# Patient Record
Sex: FEMALE | Race: WHITE | NOT HISPANIC OR LATINO | Employment: FULL TIME | URBAN - METROPOLITAN AREA
[De-identification: names, ages, dates, MRNs, and addresses within clinical notes are randomized per-mention and may not be internally consistent; named-entity substitution may affect disease eponyms.]

---

## 2021-01-27 ENCOUNTER — VIRTUAL VISIT (OUTPATIENT)
Dept: ENDOCRINOLOGY | Facility: CLINIC | Age: 21
End: 2021-01-27
Payer: COMMERCIAL

## 2021-01-27 ENCOUNTER — MEDICAL CORRESPONDENCE (OUTPATIENT)
Dept: HEALTH INFORMATION MANAGEMENT | Facility: CLINIC | Age: 21
End: 2021-01-27

## 2021-01-27 DIAGNOSIS — E10.65 TYPE 1 DIABETES MELLITUS WITH HYPERGLYCEMIA (H): Primary | ICD-10-CM

## 2021-01-27 DIAGNOSIS — Z46.81 INSULIN PUMP TITRATION: ICD-10-CM

## 2021-01-27 PROCEDURE — 99204 OFFICE O/P NEW MOD 45 MIN: CPT | Mod: 95 | Performed by: INTERNAL MEDICINE

## 2021-01-27 RX ORDER — SYRINGE-NEEDLE,INSULIN,0.5 ML 27GX1/2"
SYRINGE, EMPTY DISPOSABLE MISCELLANEOUS
COMMUNITY
Start: 2020-05-19 | End: 2023-02-24

## 2021-01-27 RX ORDER — CHLORPHENIR/PHENYLEPH/ASPIRIN 2-7.8-325
1 TABLET, EFFERVESCENT ORAL
Status: ON HOLD | COMMUNITY
Start: 2020-05-19 | End: 2022-09-28

## 2021-01-27 SDOH — HEALTH STABILITY: MENTAL HEALTH: HOW OFTEN DO YOU HAVE 6 OR MORE DRINKS ON ONE OCCASION?: NEVER

## 2021-01-27 SDOH — HEALTH STABILITY: MENTAL HEALTH: HOW MANY STANDARD DRINKS CONTAINING ALCOHOL DO YOU HAVE ON A TYPICAL DAY?: NOT ASKED

## 2021-01-27 SDOH — HEALTH STABILITY: MENTAL HEALTH: HOW OFTEN DO YOU HAVE A DRINK CONTAINING ALCOHOL?: NEVER

## 2021-01-27 NOTE — PROGRESS NOTES
"Kimberly is a 21 year old who is being evaluated via a billable telephone visit.      What phone number would you like to be contacted at? [phone]  How would you like to obtain your AVS? Mail a copy    CC: Type 1 DM    HPI:   Patient presents for management of type 1 DM.   Original diagnosis made in 2009.     She is using an insulin pump.   She tried using subcutaneous injections for a few months and noticed her control deteriorated.   Her pump broke in 11/2020.   She is currently on a TelemetryWeb 670 pump and looking to get a 770 G pump.   She does not have insurance so will likely have to go back to MDI for a while.     Basal- MN 0.70, 1800 0.800  ICR - 10  ISF - 50 (starts at 200)  AIT - 3 hours.     Tries to check glucose 4/day.   She had a DEXCOM in the past but then insurance no longer covered.     She had a Rx for the [place] but it was not covered by insurance.     [phone]  [phone]   [phone]  [phone]  [phone]  [phone]  [phone]  [phone]    Snack at 2000. She will give insulin to cover.     Admits she has worked with therapist in the past.   Mother used to nag her about checking her glucose. She moved out when patient was 14.   Then her control deteriorated.     Feels she is very adept at carb counting.     ROS: 10 point ROS neg other than the symptoms noted above in the HPI.    PMH:   Type 1 DM  Anxiety  Insulin pump use  Ventricular septal defect    Meds:  Current Outpatient Medications   Medication     Acetone, Urine, Test (KETONE TEST) STRP     blood glucose (CONTOUR NEXT TEST) test strip     blood glucose monitoring (ACCU-CHEK MULTICLIX) lancets     etonogestrel (NEXPLANON) 68 MG IMPL     glucagon 1 MG kit     insulin aspart (NOVOLOG VIAL) 100 UNITS/ML vial     insulin lispro (HUMALOG PENFILL) 100 UNIT/ML Cartridge     insulin pen needle (32G X 4 MM) 32G X 4 MM miscellaneous     insulin syringe-needle U-100 (31G X 5/16\" 0.5 ML) 31G X 5/16\" 0.5 ML miscellaneous     No " current facility-administered medications for this visit.      FHX:   No DM.     SHX:  Moved to [facility] in 8/2020.   Works as a nanny.   Non-smoker.     Exam:   Gen: In NAD.     A/P:   Type 1 DM - Outside records reviewed. She is going to need to switch back to MDI while she waits on insurance coverage. Pattern of AM highs. Given she is covering her snacks, I suspect she needs a higher basal rate overnight.   -Lab in 4 weeks.   -Until you have to turn the pump in, try running a basal rate of 0.85 U from 0400 to 0800.   -Two hours prior to shutting off the pump, give 10 Units of basaglar at night. The next morning give 7 Units of basaglar and then continue at 7 U AM and 10 U HS.    -No change to humalog dosing for meals once change to subcutaneous injections from the pump.   -Once she is back on the pump, we can try lowering her target glucose range slowly.   -ASA not indicated.  -BP: due to be checked.   -Lipids: Trg 78, HDL 58,  in 6/2020. Statin not indicated.   -Microalbumin normal in 6/2020. ACEi not indicated.   -TSH normal in 6/2020.   -Eyes: In 5/2019, no DR noted. Due for exam  -Smoking: none.      Delmar Dupont M.D    Due to the COVID 19 pandemic this visit was a telephone/video visit in order to help prevent spread of infection in this high risk patient and the general population. The patient gave verbal consent for the visit today.    I have independently reviewed and interpreted labs, imaging as indicated.     Chart review/prep time 1  0350  Chart review/prep time 2 1600  Visit Start time 1600  Visit Stop time 1628  38 minutes spent on the date of the encounter doing chart review, history and exam, documentation and further activities as noted above.  If this were an in person visit, it would be billed as 63054.  Delmar Dupont MD on 1/27/2021 at 4:29 PM

## 2021-04-14 ENCOUNTER — TELEPHONE (OUTPATIENT)
Dept: ENDOCRINOLOGY | Facility: CLINIC | Age: 21
End: 2021-04-14

## 2021-04-14 NOTE — TELEPHONE ENCOUNTER
Called Kecia and left , informed that paperwork was faxed back. Informed that I can refax on Friday when back onsite, otherwise she can fax paperwork to [place] for us to complete again tomorrow. Fax number given. Clinic number given to call with any questions.     Radha KAPADIA MA

## 2021-04-14 NOTE — TELEPHONE ENCOUNTER
Reason for call:  Other   Patient called regarding (reason for call): call back  Additional comments: paper work  Was faxed to the clinic on April 6 for the loaner insulin pump. Please call regarding the paper work.     Was faxed to [phone]    Phone number to reach patient:  Home number on file [phone] (home)    Best Time:   Any     Can we leave a detailed message on this number?  YES    Travel screening: Not Applicable

## 2021-05-10 ENCOUNTER — TELEPHONE (OUTPATIENT)
Dept: ENDOCRINOLOGY | Facility: CLINIC | Age: 21
End: 2021-05-10

## 2021-05-10 DIAGNOSIS — E10.65 TYPE 1 DIABETES MELLITUS WITH HYPERGLYCEMIA (H): Primary | ICD-10-CM

## 2021-05-10 NOTE — TELEPHONE ENCOUNTER
Forms received for patient. But...   I need some labs before I can fill them out and she is also due for a clinic visit.     Delmar Dupont MD on 5/10/2021 at 2:13 PM

## 2021-05-10 NOTE — TELEPHONE ENCOUNTER
Called and spoke with patient and informed her that she is due for a follow up and labs. Patient stated she has a follow up scheduled on 5/25/21. Scheduled patient a lab apt on 5/13/21.    Juanito ROPER RN....5/10/2021 2:19 PM

## 2021-05-13 DIAGNOSIS — E10.65 TYPE 1 DIABETES MELLITUS WITH HYPERGLYCEMIA (H): ICD-10-CM

## 2021-05-13 LAB — HBA1C MFR BLD: 10.7 % (ref 0–5.6)

## 2021-05-13 PROCEDURE — 36415 COLL VENOUS BLD VENIPUNCTURE: CPT | Performed by: INTERNAL MEDICINE

## 2021-05-13 PROCEDURE — 83036 HEMOGLOBIN GLYCOSYLATED A1C: CPT | Performed by: INTERNAL MEDICINE

## 2021-05-13 PROCEDURE — 80061 LIPID PANEL: CPT | Performed by: INTERNAL MEDICINE

## 2021-05-13 PROCEDURE — 80048 BASIC METABOLIC PNL TOTAL CA: CPT | Performed by: INTERNAL MEDICINE

## 2021-05-13 PROCEDURE — 84681 ASSAY OF C-PEPTIDE: CPT | Performed by: INTERNAL MEDICINE

## 2021-05-13 PROCEDURE — 84443 ASSAY THYROID STIM HORMONE: CPT | Performed by: INTERNAL MEDICINE

## 2021-05-13 PROCEDURE — 82043 UR ALBUMIN QUANTITATIVE: CPT | Performed by: INTERNAL MEDICINE

## 2021-05-14 LAB
ANION GAP SERPL CALCULATED.3IONS-SCNC: 5 MMOL/L (ref 3–14)
BUN SERPL-MCNC: 15 MG/DL (ref 7–30)
CALCIUM SERPL-MCNC: 9.2 MG/DL (ref 8.5–10.1)
CHLORIDE SERPL-SCNC: 103 MMOL/L (ref 94–109)
CHOLEST SERPL-MCNC: 189 MG/DL
CO2 SERPL-SCNC: 27 MMOL/L (ref 20–32)
CREAT SERPL-MCNC: 0.68 MG/DL (ref 0.52–1.04)
CREAT UR-MCNC: 173 MG/DL
GFR SERPL CREATININE-BSD FRML MDRD: >90 ML/MIN/{1.73_M2}
GLUCOSE SERPL-MCNC: 160 MG/DL (ref 70–99)
HDLC SERPL-MCNC: 57 MG/DL
LDLC SERPL CALC-MCNC: 120 MG/DL
MICROALBUMIN UR-MCNC: 37 MG/L
MICROALBUMIN/CREAT UR: 21.21 MG/G CR (ref 0–25)
NONHDLC SERPL-MCNC: 132 MG/DL
POTASSIUM SERPL-SCNC: 4.2 MMOL/L (ref 3.4–5.3)
SODIUM SERPL-SCNC: 135 MMOL/L (ref 133–144)
TRIGL SERPL-MCNC: 62 MG/DL
TSH SERPL DL<=0.005 MIU/L-ACNC: 3.23 MU/L (ref 0.4–4)

## 2021-05-17 LAB — C PEPTIDE SERPL-MCNC: <0.1 NG/ML (ref 0.9–6.9)

## 2021-05-18 ENCOUNTER — TELEPHONE (OUTPATIENT)
Dept: ENDOCRINOLOGY | Facility: CLINIC | Age: 21
End: 2021-05-18

## 2021-05-18 NOTE — TELEPHONE ENCOUNTER
Called Edwar from [employer] and left , informed that we have not received a CMN form for patient. Requested that they refax to us. Fax number given. Advised to call with questions.     Radha KAPADIA MA

## 2021-05-18 NOTE — RESULT ENCOUNTER NOTE
Called patient and left VM, requested call back to the care team. Clinic number provided.     Noted that patient is scheduled for a follow up on 5/25/21.      Radha KAPADIA MA

## 2021-05-18 NOTE — TELEPHONE ENCOUNTER
Edwar called for [employer] looking for the certificate of medical necessity and last two office note for patient to have her insulin pump replaced.    Thank you.

## 2021-05-25 ENCOUNTER — MEDICAL CORRESPONDENCE (OUTPATIENT)
Dept: HEALTH INFORMATION MANAGEMENT | Facility: CLINIC | Age: 21
End: 2021-05-25

## 2021-06-08 ENCOUNTER — MEDICAL CORRESPONDENCE (OUTPATIENT)
Dept: HEALTH INFORMATION MANAGEMENT | Facility: CLINIC | Age: 21
End: 2021-06-08

## 2021-06-22 ENCOUNTER — OFFICE VISIT (OUTPATIENT)
Dept: ENDOCRINOLOGY | Facility: CLINIC | Age: 21
End: 2021-06-22
Payer: COMMERCIAL

## 2021-06-22 VITALS
RESPIRATION RATE: 14 BRPM | BODY MASS INDEX: 23.32 KG/M2 | DIASTOLIC BLOOD PRESSURE: 83 MMHG | WEIGHT: 140 LBS | SYSTOLIC BLOOD PRESSURE: 128 MMHG | HEIGHT: 65 IN | HEART RATE: 123 BPM

## 2021-06-22 DIAGNOSIS — E10.65 TYPE 1 DIABETES MELLITUS WITH HYPERGLYCEMIA (H): Primary | ICD-10-CM

## 2021-06-22 DIAGNOSIS — Z46.81 INSULIN PUMP TITRATION: ICD-10-CM

## 2021-06-22 DIAGNOSIS — N12 PYELONEPHRITIS: ICD-10-CM

## 2021-06-22 PROBLEM — E10.9 DIABETES MELLITUS TYPE 1 (H): Status: ACTIVE | Noted: 2021-06-22

## 2021-06-22 LAB
ALBUMIN UR-MCNC: NEGATIVE MG/DL
APPEARANCE UR: CLEAR
BILIRUB UR QL STRIP: NEGATIVE
COLOR UR AUTO: YELLOW
GLUCOSE UR STRIP-MCNC: 500 MG/DL
HGB UR QL STRIP: ABNORMAL
KETONES UR STRIP-MCNC: >=80 MG/DL
LEUKOCYTE ESTERASE UR QL STRIP: NEGATIVE
NITRATE UR QL: NEGATIVE
PH UR STRIP: 5.5 PH (ref 5–7)
RBC #/AREA URNS AUTO: ABNORMAL /HPF
SOURCE: ABNORMAL
SP GR UR STRIP: 1.01 (ref 1–1.03)
UROBILINOGEN UR STRIP-ACNC: 0.2 EU/DL (ref 0.2–1)
WBC #/AREA URNS AUTO: ABNORMAL /HPF

## 2021-06-22 PROCEDURE — 81001 URINALYSIS AUTO W/SCOPE: CPT | Performed by: INTERNAL MEDICINE

## 2021-06-22 PROCEDURE — 99214 OFFICE O/P EST MOD 30 MIN: CPT | Performed by: INTERNAL MEDICINE

## 2021-06-22 ASSESSMENT — MIFFLIN-ST. JEOR: SCORE: 1405.92

## 2021-06-22 NOTE — NURSING NOTE
"Chief Complaint   Patient presents with     Follow Up     Diabetes       Initial /83 (BP Location: Right arm, Patient Position: Sitting, Cuff Size: Adult Regular)   Pulse 123   Resp 14   Ht 1.651 m (5' 5\")   Wt 63.5 kg (140 lb)   BMI 23.30 kg/m   Estimated body mass index is 23.3 kg/m  as calculated from the following:    Height as of this encounter: 1.651 m (5' 5\").    Weight as of this encounter: 63.5 kg (140 lb).  BP completed using cuff size: regular  Medications and allergies reviewed.      Radha KAPADIA MA    "

## 2021-06-22 NOTE — LETTER
"    6/22/2021         RE: Kimberly Patterson  [address]  [facility] [address]        Dear Colleague,    Thank you for referring your patient, Kimberly Patterson, to the [facility]. Please see a copy of my visit note below.    S:   Patient presents for management of type 1 DM.   Original diagnosis made in 2009.     She is using an insulin pump.   She tried using subcutaneous injections for a few months and noticed her control deteriorated.   Her pump broke in 11/2020.     She has had pyelonephritis twice in the last 5 weeks.     Tries to check glucose 4/day.     Just changed back onto a pump today.   She was taking basaglar 7 U AM and 10 U HS.   Novolog 1/15 gram, 1/50 U.     PUMP SETTINGS:  Patient is on a 770 G pump   Basal rates: MN 0.700, 0400 0.850, 0800 0.700, 1800 0.800   Carbohydrate to insulin ratios: 10.   Sensitivity; 1 unit will drop her 50 mg/dl.   Blood glucose targets: .   Active insulin time: 4 hours    Left meter at home.   CGM being shipped now.   Remarks she has rarely been having lows.   Pattern of post prandial highs noted.   Correction boluses did not alter glucose much either.   Fasting readings in 170-200 range.     ROS: 10 point ROS neg other than the symptoms noted above in the HPI.    Exam:   Vital signs:      BP: 128/83 Pulse: 123   Resp: 14       Height: 165.1 cm (5' 5\") Weight: 63.5 kg (140 lb)  Estimated body mass index is 23.3 kg/m  as calculated from the following:    Height as of this encounter: 1.651 m (5' 5\").    Weight as of this encounter: 63.5 kg (140 lb).  Gen: In NAD.   HEENT: no proptosis or lid lag, EOMI, MMM.   Card: S1 S2 RRR no m/r/g.  Pulm: CTA b/l.   Ext: no LE edema.   Neuro: no tremor, +2 DTR's. Normal monofilament.       A/P:   Type 1 DM - Outside records reviewed. She is going to need to switch back to MDI while she waits on insurance coverage. Pattern of AM highs. Given she is covering her snacks, I suspect she needs a higher basal rate " overnight.   In in 6/2021, 10.7%. Just got back on pump and CGM is being shipped. Pattern of post prandial highs noted. Total basal currently 18 units.   -Change insulin to carb ratio to 13  -Change insulin sensitivity to 45.   -Start CGM as planned.   -Set up a [employer] account. Let me know by Samanaget what your username and password are and I can check your data.   -Labs in 3 months. See me after.   -ASA not indicated.  -BP: due to be checked.   -Lipids: Trg 78, HDL 58,  in 6/2020.    , HDL 57, Trg 62 in 5/2021.    Statin not indicated.   -Microalbumin normal in 6/2020. ACEi not indicated.   Normal in 5/2021.  -TSH normal in 6/2020.    Normal 6/2021.   -Eyes: In 5/2019, no DR noted. Scheduled for 7/2/21.   -Smoking: none.      Pyelonephritis - finishing Abx today. Will check UA to ensure she has cleared it.     Delmar Dupont M.D        Again, thank you for allowing me to participate in the care of your patient.        Sincerely,        Delmar Dupont MD

## 2021-06-22 NOTE — PATIENT INSTRUCTIONS
-Change insulin to carb ratio to 13  -Change insulin sensitivity to 45.   -Start CGM as planned.   -Set up a [employer] account. Let me know by Gaopeng what your username and password are and I can check your data.   -Labs in 3 months. See me after.     -UA today.

## 2021-06-22 NOTE — PROGRESS NOTES
"S:   Patient presents for management of type 1 DM.   Original diagnosis made in 2009.     She is using an insulin pump.   She tried using subcutaneous injections for a few months and noticed her control deteriorated.   Her pump broke in 11/2020.     She has had pyelonephritis twice in the last 5 weeks.     Tries to check glucose 4/day.     Just changed back onto a pump today.   She was taking basaglar 7 U AM and 10 U HS.   Novolog 1/15 gram, 1/50 U.     PUMP SETTINGS:  Patient is on a 770 G pump   Basal rates: MN 0.700, 0400 0.850, 0800 0.700, 1800 0.800   Carbohydrate to insulin ratios: 10.   Sensitivity; 1 unit will drop her 50 mg/dl.   Blood glucose targets: .   Active insulin time: 4 hours    Left meter at home.   CGM being shipped now.   Remarks she has rarely been having lows.   Pattern of post prandial highs noted.   Correction boluses did not alter glucose much either.   Fasting readings in 170-200 range.     ROS: 10 point ROS neg other than the symptoms noted above in the HPI.    Exam:   Vital signs:      BP: 128/83 Pulse: 123   Resp: 14       Height: 165.1 cm (5' 5\") Weight: 63.5 kg (140 lb)  Estimated body mass index is 23.3 kg/m  as calculated from the following:    Height as of this encounter: 1.651 m (5' 5\").    Weight as of this encounter: 63.5 kg (140 lb).  Gen: In NAD.   HEENT: no proptosis or lid lag, EOMI, MMM.   Card: S1 S2 RRR no m/r/g.  Pulm: CTA b/l.   Ext: no LE edema.   Neuro: no tremor, +2 DTR's. Normal monofilament.       A/P:   Type 1 DM - Outside records reviewed. She is going to need to switch back to MDI while she waits on insurance coverage. Pattern of AM highs. Given she is covering her snacks, I suspect she needs a higher basal rate overnight.   In in 6/2021, 10.7%. Just got back on pump and CGM is being shipped. Pattern of post prandial highs noted. Total basal currently 18 units.   -Change insulin to carb ratio to 13  -Change insulin sensitivity to 45.   -Start CGM as " planned.   -Set up a [employer] account. Let me know by Go World! what your username and password are and I can check your data.   -Labs in 3 months. See me after.   -ASA not indicated.  -BP: due to be checked.   -Lipids: Trg 78, HDL 58,  in 6/2020.    , HDL 57, Trg 62 in 5/2021.    Statin not indicated.   -Microalbumin normal in 6/2020. ACEi not indicated.   Normal in 5/2021.  -TSH normal in 6/2020.    Normal 6/2021.   -Eyes: In 5/2019, no DR noted. Scheduled for 7/2/21.   -Smoking: none.      Pyelonephritis - finishing Abx today. Will check UA to ensure she has cleared it.     Delmar Dupont M.D

## 2021-07-02 ENCOUNTER — OFFICE VISIT (OUTPATIENT)
Dept: OPTOMETRY | Facility: CLINIC | Age: 21
End: 2021-07-02
Payer: COMMERCIAL

## 2021-07-02 DIAGNOSIS — H52.221 REGULAR ASTIGMATISM OF RIGHT EYE: ICD-10-CM

## 2021-07-02 DIAGNOSIS — H52.13 MYOPIA OF BOTH EYES: ICD-10-CM

## 2021-07-02 DIAGNOSIS — Z01.01 ENCOUNTER FOR EXAMINATION OF EYES AND VISION WITH ABNORMAL FINDINGS: Primary | ICD-10-CM

## 2021-07-02 DIAGNOSIS — E10.9 TYPE 1 DIABETES MELLITUS WITHOUT RETINOPATHY (H): ICD-10-CM

## 2021-07-02 PROCEDURE — 92004 COMPRE OPH EXAM NEW PT 1/>: CPT | Performed by: OPTOMETRIST

## 2021-07-02 ASSESSMENT — REFRACTION_MANIFEST
OD_CYLINDER: +0.50
OD_SPHERE: -3.25
OD_AXIS: 092
OS_SPHERE: -2.75
OS_CYLINDER: SPHERE

## 2021-07-02 ASSESSMENT — VISUAL ACUITY
CORRECTION_TYPE: GLASSES
OD_SC: 20/60
OD_CC: 20/20
OS_SC: 20/200
OS_CC+: -1
OD_CC: 20/20
OD_SC: CF @ 3'
OS_CC: 20/20
OS_CC: 20/30
METHOD: SNELLEN - LINEAR
OS_SC: 20/20

## 2021-07-02 ASSESSMENT — CONF VISUAL FIELD
METHOD: COUNTING FINGERS
OS_NORMAL: 1
OD_NORMAL: 1

## 2021-07-02 ASSESSMENT — SLIT LAMP EXAM - LIDS
COMMENTS: NORMAL
COMMENTS: NORMAL

## 2021-07-02 ASSESSMENT — REFRACTION_WEARINGRX
OD_SPHERE: -2.75
OS_SPHERE: -2.75
OD_CYLINDER: SPHERE
OS_CYLINDER: SPHERE

## 2021-07-02 ASSESSMENT — EXTERNAL EXAM - RIGHT EYE: OD_EXAM: NORMAL

## 2021-07-02 ASSESSMENT — EXTERNAL EXAM - LEFT EYE: OS_EXAM: NORMAL

## 2021-07-02 ASSESSMENT — CUP TO DISC RATIO
OS_RATIO: 0.35
OD_RATIO: 0.45

## 2021-07-02 ASSESSMENT — TONOMETRY
OS_IOP_MMHG: 14
OD_IOP_MMHG: 14
IOP_METHOD: APPLANATION

## 2021-07-02 NOTE — PATIENT INSTRUCTIONS
Patient educated on importance of good blood sugar control.  Letter sent to primary care provider with diabetic eye exam report.     Kimberly was advised of today's exam findings.  Optional to fill new glasses prescription, mild change.  Copy of glasses Rx provided today.    Return in 1 year for eye exam, or sooner if needed.    The effects of the dilating drops last for 4- 6 hours.  You will be more sensitive to light and vision will be blurry up close.  Mydriatic sunglasses were given if needed.      Azam Herring O.D.  [address] [place]  [address]    [phone]

## 2021-07-02 NOTE — PROGRESS NOTES
Chief Complaint   Patient presents with     Diabetic Eye Exam     Accompanied by self  Hemoglobin A1C   Date Value Ref Range Status   05/13/2021 10.7 (H) 0 - 5.6 % Final     Comment:     Results confirmed by repeat test  Normal <5.7% Prediabetes 5.7-6.4%  Diabetes 6.5% or higher - adopted from ADA   consensus guidelines.           Last Eye Exam: 2019  Dilated Previously: Yes, side effects of dilation explained today    What are you currently using to see?  glasses    Distance Vision Acuity: Satisfied with vision    Near Vision Acuity: Not satisfied - gets headaches when she wears glasses on phone or CRT    Eye Comfort: good  Do you use eye drops? 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       OBJECTIVE: See Ophthalmology exam    ASSESSMENT:    ICD-10-CM    1. Encounter for examination of eyes and vision with abnormal findings  Z01.01    2. Type 1 diabetes mellitus without retinopathy (H)  E10.9    3. Myopia of both eyes  H52.13    4. Regular astigmatism of right eye  H52.221       PLAN:    Kimberly Patterson aware  eye exam results will be sent to No Ref-Primary, Physician.  Patient Instructions   Patient educated on importance of good blood sugar control.  Letter sent to primary care provider with diabetic eye exam report.     Kimberly was advised of today's exam findings.  Optional to fill new glasses prescription, mild change.  Copy of glasses Rx provided today.    Return in 1 year for eye exam, or sooner if needed.    The effects of the dilating drops last for 4- 6 hours.  You will be more sensitive to light and vision will be blurry up close.  Mydriatic sunglasses were given if needed.      Azam Herring O.D.  [address]  [place]  [address]    [phone]

## 2021-07-02 NOTE — LETTER
7/2/2021         RE: Kimberly Patterson  [address]  [facility] [address]        Dear Colleague,    Thank you for referring your patient, Kimberly Patterson, to the [facility]. Please see a copy of my visit note below.    Chief Complaint   Patient presents with     Diabetic Eye Exam     Accompanied by self  Hemoglobin A1C   Date Value Ref Range Status   05/13/2021 10.7 (H) 0 - 5.6 % Final     Comment:     Results confirmed by repeat test  Normal <5.7% Prediabetes 5.7-6.4%  Diabetes 6.5% or higher - adopted from ADA   consensus guidelines.           Last Eye Exam: 2019  Dilated Previously: Yes, side effects of dilation explained today    What are you currently using to see?  glasses    Distance Vision Acuity: Satisfied with vision    Near Vision Acuity: Not satisfied - gets headaches when she wears glasses on phone or CRT    Eye Comfort: good  Do you use eye drops? 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       OBJECTIVE: See Ophthalmology exam    ASSESSMENT:    ICD-10-CM    1. Encounter for examination of eyes and vision with abnormal findings  Z01.01    2. Type 1 diabetes mellitus without retinopathy (H)  E10.9    3. Myopia of both eyes  H52.13    4. Regular astigmatism of right eye  H52.221       PLAN:    Kimberly Patterson aware  eye exam results will be sent to No Ref-Primary, Physician.  Patient Instructions   Patient educated on importance of good blood sugar control.  Letter sent to primary care provider with diabetic eye exam report.     Kimberly was advised of today's exam findings.  Optional to fill new glasses prescription, mild change.  Copy of glasses Rx provided today.    Return in 1 year for eye exam, or sooner if needed.    The effects of the dilating drops last for 4- 6 hours.  You will be more sensitive to light and vision will be blurry up close.  Mydriatic sunglasses were given if  needed.      Azam Herring O.D.  [address]  [place]  [address]    [phone]               Again, thank you for allowing me to participate in the care of your patient.        Sincerely,        Azam Herring OD

## 2021-07-07 ENCOUNTER — TELEPHONE (OUTPATIENT)
Dept: ENDOCRINOLOGY | Facility: CLINIC | Age: 21
End: 2021-07-07

## 2021-07-07 NOTE — TELEPHONE ENCOUNTER
Called patient and left VM, informed that we need her recent BG log. Requested call back to the ENDO care team, to get help connecting [employer] Carelink pump. Clinic number provided.    Radha KAPADIA MA

## 2021-07-07 NOTE — TELEPHONE ENCOUNTER
----- Message from Delmar Dupont MD sent at 7/6/2021  4:40 PM CDT -----  Please help patient to connect to [employer] so I can review how she is doing since our last visit.     Thank you,   Delmar Dupont MD on 7/6/2021 at 4:41 PM  ----- Message -----  From: Delmar Dupont MD  Sent: 7/6/2021  To: Delmar Dupont MD    [facility] Media[facility]

## 2021-07-08 NOTE — TELEPHONE ENCOUNTER
Called and spoke to patient, informed that Dr. Dupont would like to her bg logs to see how she is doing since last ov. Informed patient that I sent an invite from [employer] for her to create an acct and accept our invite to share her data. Informed that I would also email her a copy of the instructions on how to set up her acct. Patient voiced understanding and will call us back when she has her acct all set up. Advised to call with any questions or concerns.     [employer] connection info sheet emailed to patient.     Radha KAPADIA MA

## 2021-07-13 NOTE — TELEPHONE ENCOUNTER
Was informed from Marleen at [employer], that patient missed her training appt with Julieta at [employer] on 7/12/21. They are reaching out to patient to get rescheduled and then will let us know when she has been set up to have her pump connected online.     Radha KAPADIA MA

## 2021-09-21 ENCOUNTER — LAB (OUTPATIENT)
Dept: LAB | Facility: CLINIC | Age: 21
End: 2021-09-21

## 2021-09-21 ENCOUNTER — OFFICE VISIT (OUTPATIENT)
Dept: ENDOCRINOLOGY | Facility: CLINIC | Age: 21
End: 2021-09-21
Payer: COMMERCIAL

## 2021-09-21 VITALS
WEIGHT: 132.4 LBS | RESPIRATION RATE: 14 BRPM | HEIGHT: 65 IN | BODY MASS INDEX: 22.06 KG/M2 | SYSTOLIC BLOOD PRESSURE: 114 MMHG | HEART RATE: 93 BPM | DIASTOLIC BLOOD PRESSURE: 76 MMHG

## 2021-09-21 DIAGNOSIS — Z46.81 INSULIN PUMP TITRATION: ICD-10-CM

## 2021-09-21 DIAGNOSIS — E10.65 TYPE 1 DIABETES MELLITUS WITH HYPERGLYCEMIA (H): ICD-10-CM

## 2021-09-21 DIAGNOSIS — E10.65 UNCONTROLLED TYPE 1 DIABETES MELLITUS WITH HYPERGLYCEMIA (H): Primary | ICD-10-CM

## 2021-09-21 LAB — HBA1C MFR BLD: 8.2 % (ref 0–5.6)

## 2021-09-21 PROCEDURE — 36415 COLL VENOUS BLD VENIPUNCTURE: CPT

## 2021-09-21 PROCEDURE — 99214 OFFICE O/P EST MOD 30 MIN: CPT | Performed by: INTERNAL MEDICINE

## 2021-09-21 PROCEDURE — 83036 HEMOGLOBIN GLYCOSYLATED A1C: CPT

## 2021-09-21 ASSESSMENT — MIFFLIN-ST. JEOR: SCORE: 1371.44

## 2021-09-21 NOTE — NURSING NOTE
"Chief Complaint   Patient presents with     Diabetes     Follow Up       Initial /76 (BP Location: Right arm, Patient Position: Sitting, Cuff Size: Adult Regular)   Pulse 93   Resp 14   Ht 1.651 m (5' 5\")   Wt 60.1 kg (132 lb 6.4 oz)   BMI 22.03 kg/m   Estimated body mass index is 22.03 kg/m  as calculated from the following:    Height as of this encounter: 1.651 m (5' 5\").    Weight as of this encounter: 60.1 kg (132 lb 6.4 oz).  BP completed using cuff size: regular  Medications and allergies reviewed.      Radha KAPADIA MA    "

## 2021-09-21 NOTE — LETTER
"    9/21/2021         RE: Kimberly Patterson  [address]  [id number]  [facility] [facility] [address]        Dear Colleague,    Thank you for referring your patient, Kimberly Patterson, to the [facility]. Please see a copy of my visit note below.    S:   Patient presents for management of type 1 DM.   Original diagnosis made in 2009.     Notes she is dropping significantly after meals and wonders if ICR needs to be changed.   If down arrow prior to eating, she will wait for it to flatten before bolusing.     Checks glucose 4/day.     PUMP SETTINGS:  Patient is on a 770 G pump   Basal rates: MN 0.700, 0400 0.900, 0800 0.750, 1800 0.850   Carbohydrate to insulin ratios: 8.5  Sensitivity; 1 unit will drop her 45 mg/dl.   Blood glucose targets: .   Active insulin time: 4 hours    TDI 30.6 Units  68% is basal.     CGM:  179, SD 69    Post prandial spikes followed by lows.   In part due to timing of meal time insulin.     ROS: 10 point ROS neg other than the symptoms noted above in the HPI.    Exam:   Vital signs:      BP: 114/76 Pulse: 93   Resp: 14       Height: 165.1 cm (5' 5\") Weight: 60.1 kg (132 lb 6.4 oz)  Estimated body mass index is 22.03 kg/m  as calculated from the following:    Height as of this encounter: 1.651 m (5' 5\").    Weight as of this encounter: 60.1 kg (132 lb 6.4 oz).  Gen: In NAD.   HEENT: no proptosis or lid lag, EOMI, MMM.     A/P:   Type 1 DM - Outside records reviewed. She is going to need to switch back to MDI while she waits on insurance coverage. Pattern of AM highs. Given she is covering her snacks, I suspect she needs a higher basal rate overnight.   In in 6/2021, 10.7%. Just got back on pump and CGM is being shipped. Pattern of post prandial highs noted. Total basal currently 18 units.   In 9/2021, 8.2%. Post prandial highs followed by lows. Needs to work on insulin timing. ICR also appears to need adjustment.   -Change insulin to carb ratio from 8.5 to 10.  -Focus on bolusing 15 " minutes before eating or no later than you sit down to eat.   -Labs in 3 months. See me after.   -ASA not indicated.  -BP: controlled.   -Lipids: Trg 78, HDL 58,  in 6/2020.    , HDL 57, Trg 62 in 5/2021.    Statin not indicated.   -Microalbumin normal in 6/2020. ACEi not indicated.   Normal in 5/2021.  -TSH normal in 6/2020.    Normal 6/2021.   -Eyes: no DR in 7/2021.   -Smoking: none.      Insulin pump use - as above.     Delmar Dupont M.D        Again, thank you for allowing me to participate in the care of your patient.        Sincerely,        Delmar Dupont MD

## 2021-09-21 NOTE — PATIENT INSTRUCTIONS
-Change insulin to carb ratio from 8.5 to 10.  -Focus on bolusing 15 minutes before eating or no later than you sit down to eat.   -Labs in 3 months. See me after.

## 2021-09-21 NOTE — PROGRESS NOTES
"S:   Patient presents for management of type 1 DM.   Original diagnosis made in 2009.     Notes she is dropping significantly after meals and wonders if ICR needs to be changed.   If down arrow prior to eating, she will wait for it to flatten before bolusing.     Checks glucose 4/day.     PUMP SETTINGS:  Patient is on a 770 G pump   Basal rates: MN 0.700, 0400 0.900, 0800 0.750, 1800 0.850   Carbohydrate to insulin ratios: 8.5  Sensitivity; 1 unit will drop her 45 mg/dl.   Blood glucose targets: .   Active insulin time: 4 hours    TDI 30.6 Units  68% is basal.     CGM:  179, SD 69    Post prandial spikes followed by lows.   In part due to timing of meal time insulin.     ROS: 10 point ROS neg other than the symptoms noted above in the HPI.    Exam:   Vital signs:      BP: 114/76 Pulse: 93   Resp: 14       Height: 165.1 cm (5' 5\") Weight: 60.1 kg (132 lb 6.4 oz)  Estimated body mass index is 22.03 kg/m  as calculated from the following:    Height as of this encounter: 1.651 m (5' 5\").    Weight as of this encounter: 60.1 kg (132 lb 6.4 oz).  Gen: In NAD.   HEENT: no proptosis or lid lag, EOMI, MMM.     A/P:   Type 1 DM - Outside records reviewed. She is going to need to switch back to MDI while she waits on insurance coverage. Pattern of AM highs. Given she is covering her snacks, I suspect she needs a higher basal rate overnight.   In in 6/2021, 10.7%. Just got back on pump and CGM is being shipped. Pattern of post prandial highs noted. Total basal currently 18 units.   In 9/2021, 8.2%. Post prandial highs followed by lows. Needs to work on insulin timing. ICR also appears to need adjustment.   -Change insulin to carb ratio from 8.5 to 10.  -Focus on bolusing 15 minutes before eating or no later than you sit down to eat.   -Labs in 3 months. See me after.   -ASA not indicated.  -BP: controlled.   -Lipids: Trg 78, HDL 58,  in 6/2020.    , HDL 57, Trg 62 in 5/2021.    Statin not indicated. "   -Microalbumin normal in 6/2020. ACEi not indicated.   Normal in 5/2021.  -TSH normal in 6/2020.    Normal 6/2021.   -Eyes: no DR in 7/2021.   -Smoking: none.      Insulin pump use - as above.     Delmar Dupont M.D

## 2021-11-04 ENCOUNTER — TELEPHONE (OUTPATIENT)
Dept: ENDOCRINOLOGY | Facility: CLINIC | Age: 21
End: 2021-11-04

## 2021-11-04 NOTE — TELEPHONE ENCOUNTER
Reason for Call:  Form, our goal is to have forms completed with 72 hours, however, some forms may require a visit or additional information.    Type of letter, form or note:  medical    Who is the form from?: Patient    Where did the form come from: Patient or family brought in       What clinic location was the form placed at?: [facility]    Where the form was placed: PUT IN MAILBOX Box/Folder    What number is listed as a contact on the form?: [phone]       Additional comments: PATIENT WILL  FORM    Call taken on 11/4/2021 at 8:56 AM by Snehal Barber

## 2021-11-09 NOTE — TELEPHONE ENCOUNTER
Mailed and faxed for to [place] [employer] public safety and also mailed to patient    SOFIA Christopher

## 2022-07-28 ENCOUNTER — OFFICE VISIT (OUTPATIENT)
Dept: FAMILY MEDICINE | Facility: CLINIC | Age: 22
End: 2022-07-28
Payer: COMMERCIAL

## 2022-07-28 VITALS
HEART RATE: 98 BPM | WEIGHT: 134.25 LBS | OXYGEN SATURATION: 97 % | RESPIRATION RATE: 16 BRPM | DIASTOLIC BLOOD PRESSURE: 68 MMHG | SYSTOLIC BLOOD PRESSURE: 118 MMHG | HEIGHT: 65 IN | BODY MASS INDEX: 22.37 KG/M2

## 2022-07-28 DIAGNOSIS — R10.2 VAGINAL PAIN: ICD-10-CM

## 2022-07-28 DIAGNOSIS — Z12.4 CERVICAL CANCER SCREENING: ICD-10-CM

## 2022-07-28 DIAGNOSIS — Z00.00 ROUTINE GENERAL MEDICAL EXAMINATION AT A HEALTH CARE FACILITY: Primary | ICD-10-CM

## 2022-07-28 DIAGNOSIS — B96.89 BV (BACTERIAL VAGINOSIS): ICD-10-CM

## 2022-07-28 DIAGNOSIS — N76.0 BV (BACTERIAL VAGINOSIS): ICD-10-CM

## 2022-07-28 DIAGNOSIS — E10.65 TYPE 1 DIABETES MELLITUS WITH HYPERGLYCEMIA (H): ICD-10-CM

## 2022-07-28 LAB
CLUE CELLS: PRESENT
TRICHOMONAS, WET PREP: ABNORMAL
WBC'S/HIGH POWER FIELD, WET PREP: ABNORMAL
YEAST, WET PREP: ABNORMAL

## 2022-07-28 PROCEDURE — G0145 SCR C/V CYTO,THINLAYER,RESCR: HCPCS | Performed by: FAMILY MEDICINE

## 2022-07-28 PROCEDURE — 99385 PREV VISIT NEW AGE 18-39: CPT | Performed by: FAMILY MEDICINE

## 2022-07-28 PROCEDURE — 87210 SMEAR WET MOUNT SALINE/INK: CPT | Performed by: FAMILY MEDICINE

## 2022-07-28 RX ORDER — METRONIDAZOLE 500 MG/1
500 TABLET ORAL 2 TIMES DAILY
Qty: 14 TABLET | Refills: 0 | Status: SHIPPED | OUTPATIENT
Start: 2022-07-28 | End: 2022-08-04

## 2022-07-28 ASSESSMENT — ENCOUNTER SYMPTOMS
HEADACHES: 0
HEARTBURN: 0
DIARRHEA: 0
MYALGIAS: 0
DIZZINESS: 0
FREQUENCY: 1
CHILLS: 0
EYE PAIN: 0
JOINT SWELLING: 0
PARESTHESIAS: 0
WEAKNESS: 0
SORE THROAT: 0
PALPITATIONS: 0
HEMATURIA: 0
ARTHRALGIAS: 0
COUGH: 0
NERVOUS/ANXIOUS: 1
DYSURIA: 0
HEMATOCHEZIA: 1
NAUSEA: 0
ABDOMINAL PAIN: 0
BREAST MASS: 0
CONSTIPATION: 0
FEVER: 0
SHORTNESS OF BREATH: 0

## 2022-07-28 NOTE — PROGRESS NOTES
SUBJECTIVE:   CC: Kimberly Patterson is an 22 year old woman who presents for preventive health visit.      Patient has been advised of split billing requirements and indicates understanding: Yes  Healthy Habits:     Getting at least 3 servings of Calcium per day:  NO    Bi-annual eye exam:  Yes    Dental care twice a year:  NO    Sleep apnea or symptoms of sleep apnea:  None    Diet:  Regular (no restrictions)    Frequency of exercise:  None    Taking medications regularly:  Yes    Medication side effects:  None    PHQ-2 Total Score: 2    Additional concerns today:  Yes    Dyspareunia. Denies any vaginal discharge or odor. 1 male partner- no concern about STI. Has nexplanon.    History of ventricular septal defect s/p surgery at [place].    Type 1 diabetes. Follows with endocrinology.     Myopia- follows with ophthalmology. Due for appointment.      Today's PHQ-2 Score:   PHQ-2 ( 1999 Pf[employer]) 7/28/2022   Q1: Little interest or pleasure in doing things 1   Q2: Feeling down, depressed or hopeless 1   PHQ-2 Score 2   Q1: Little interest or pleasure in doing things Several days   Q2: Feeling down, depressed or hopeless Several days   PHQ-2 Score 2       Abuse: Current or Past (Physical, Sexual or Emotional) - No  Do you feel safe in your environment? Yes    Have you ever done Advance Care Planning? (For example, a Health Directive, POLST, or a discussion with a medical provider or your loved ones about your wishes): No, advance care planning information given to patient to review.  Patient plans to discuss their wishes with loved ones or provider.      Social History     Tobacco Use     Smoking status: Never Smoker     Smokeless tobacco: Never Used   Substance Use Topics     Alcohol use: Never     If you drink alcohol do you typically have >3 drinks per day or >7 drinks per week? No    Alcohol Use 7/28/2022   Prescreen: >3 drinks/day or >7 drinks/week? No   No flowsheet data found.    Reviewed orders with patient.  Reviewed  "health maintenance and updated orders accordingly - Yes        History of abnormal Pap smear: never had pap smear before     Reviewed and updated as needed this visit by clinical staff   Tobacco  Allergies  Meds                Reviewed and updated as needed this visit by Provider   Tobacco  Allergies  Meds  Problems  Med Hx  Surg Hx  Fam Hx               Review of Systems       OBJECTIVE:   /68   Pulse 98   Resp 16   Ht 1.651 m (5' 5\")   Wt 60.9 kg (134 lb 4 oz)   LMP 06/27/2022 (Within Days)   SpO2 97%   Breastfeeding No   BMI 22.34 kg/m    Physical Exam  General: Alert, no distress  Eyes: PERRL, EOMI, sclera normal.  HENT: Normocephalic, no pharyngeal erythema, MMM.  Neck: Supple, no adenopathy.  Heart: Normal S1 and S2, regular rhythm. No murmurs, gallops, rubs.  Lungs: CTA bilaterally, no wheezes, no crackles, no rhonchi.   (female): External genitalia is without lesions. Introitus is normal, vaginal walls pink and moist without lesions or evidence of trauma. No cervical lesions or discharge.  Psych: Normal mood and affect.      ASSESSMENT/PLAN:     Kimberly was seen today for physical.    Diagnoses and all orders for this visit:    Routine general medical examination at a health care facility    Cervical cancer screening  -     Pap Screen only - recommended age 21 - 24 years    Vaginal pain: Dyspareunia. Check swabs. Patient declines STI testing- no concern for infection.  -     Wet prep - Clinic Collect    Type 1 diabetes mellitus with hyperglycemia (H): follows with endocrinology.    BV (bacterial vaginosis):  -     metroNIDAZOLE (FLAGYL) 500 MG tablet; Take 1 tablet (500 mg) by mouth 2 times daily for 7 days          COUNSELING:  Reviewed preventive health counseling, as reflected in patient instructions    Estimated body mass index is 22.34 kg/m  as calculated from the following:    Height as of this encounter: 1.651 m (5' 5\").    Weight as of this encounter: 60.9 kg (134 lb 4 " oz).        She reports that she has never smoked. She has never used smokeless tobacco.      Counseling Resources:  ATP IV Guidelines  Pooled Cohorts Equation Calculator  Breast Cancer Risk Calculator  BRCA-Related Cancer Risk Assessment: FHS-7 Tool  FRAX Risk Assessment  ICSI Preventive Guidelines  Dietary Guidelines for Americans, 2010  USDA's MyPlate  ASA Prophylaxis  Lung CA Screening    Emily Moyer MD  [facility]

## 2022-08-02 LAB
BKR LAB AP GYN ADEQUACY: NORMAL
BKR LAB AP GYN INTERPRETATION: NORMAL
BKR LAB AP HPV REFLEX: NORMAL
BKR LAB AP LMP: NORMAL
BKR LAB AP PREVIOUS ABNORMAL: NORMAL
PATH REPORT.COMMENTS IMP SPEC: NORMAL
PATH REPORT.COMMENTS IMP SPEC: NORMAL
PATH REPORT.RELEVANT HX SPEC: NORMAL

## 2022-09-23 NOTE — TELEPHONE ENCOUNTER
RECORDS RECEIVED FROM: internal    DATE RECEIVED: 9/27/22    NOTES (FOR ALL VISITS) STATUS DETAILS   OFFICE NOTES from referring provider internal  Delmar Dupont MD     MEDICATION LIST internal     IMAGING        CT (HEAD/NECK/CHEST/ABDOMEN) [facility]- 6.15.21      LABS     DIABETES: HBGA1C, CREATININE, FASTING LIPIDS, MICROALBUMIN URINE, POTASSIUM, TSH, T4  THYROID: TSH, T4, CBC, THYRODLONULIN, TOTAL T3, FREE T4, CALCITONIN, CEA internal /ce

## 2022-09-25 ENCOUNTER — OFFICE VISIT (OUTPATIENT)
Dept: FAMILY MEDICINE | Facility: CLINIC | Age: 22
End: 2022-09-25
Payer: COMMERCIAL

## 2022-09-25 ENCOUNTER — HOSPITAL ENCOUNTER (INPATIENT)
Facility: HOSPITAL | Age: 22
LOS: 3 days | Discharge: HOME | End: 2022-09-28
Attending: EMERGENCY MEDICINE | Admitting: INTERNAL MEDICINE
Payer: COMMERCIAL

## 2022-09-25 VITALS
OXYGEN SATURATION: 97 % | SYSTOLIC BLOOD PRESSURE: 119 MMHG | WEIGHT: 125.3 LBS | HEART RATE: 110 BPM | TEMPERATURE: 98.8 F | DIASTOLIC BLOOD PRESSURE: 80 MMHG | BODY MASS INDEX: 20.85 KG/M2 | RESPIRATION RATE: 16 BRPM

## 2022-09-25 DIAGNOSIS — E10.65 TYPE 1 DIABETES MELLITUS WITH HYPERGLYCEMIA (H): ICD-10-CM

## 2022-09-25 DIAGNOSIS — K61.2 ABSCESS OF ANAL AND RECTAL REGIONS: ICD-10-CM

## 2022-09-25 DIAGNOSIS — K61.1 PERIRECTAL ABSCESS: Primary | ICD-10-CM

## 2022-09-25 DIAGNOSIS — E11.10 DKA (DIABETIC KETOACIDOSIS) (H): ICD-10-CM

## 2022-09-25 LAB
ALBUMIN SERPL BCG-MCNC: 3.6 G/DL (ref 3.5–5.2)
ALBUMIN UR-MCNC: NEGATIVE MG/DL
ALP SERPL-CCNC: 179 U/L (ref 35–104)
ALT SERPL W P-5'-P-CCNC: 7 U/L (ref 10–35)
ANION GAP SERPL CALCULATED.3IONS-SCNC: 12 MMOL/L (ref 7–15)
ANION GAP SERPL CALCULATED.3IONS-SCNC: 13 MMOL/L (ref 7–15)
ANION GAP SERPL CALCULATED.3IONS-SCNC: 16 MMOL/L (ref 7–15)
ANION GAP SERPL CALCULATED.3IONS-SCNC: 21 MMOL/L (ref 7–15)
APPEARANCE UR: CLEAR
AST SERPL W P-5'-P-CCNC: 13 U/L (ref 10–35)
BACTERIA #/AREA URNS HPF: ABNORMAL /HPF
BASE EXCESS BLDV CALC-SCNC: -17.9 MMOL/L
BASOPHILS # BLD AUTO: 0 10E3/UL (ref 0–0.2)
BASOPHILS NFR BLD AUTO: 0 %
BILIRUB DIRECT SERPL-MCNC: <0.2 MG/DL (ref 0–0.3)
BILIRUB SERPL-MCNC: 0.3 MG/DL
BILIRUB UR QL STRIP: NEGATIVE
BUN SERPL-MCNC: 3.2 MG/DL (ref 6–20)
BUN SERPL-MCNC: 4.6 MG/DL (ref 6–20)
BUN SERPL-MCNC: 7.2 MG/DL (ref 6–20)
BUN SERPL-MCNC: 9.4 MG/DL (ref 6–20)
CALCIUM SERPL-MCNC: 7.4 MG/DL (ref 8.6–10)
CALCIUM SERPL-MCNC: 7.8 MG/DL (ref 8.6–10)
CALCIUM SERPL-MCNC: 8.4 MG/DL (ref 8.6–10)
CALCIUM SERPL-MCNC: 8.8 MG/DL (ref 8.6–10)
CHLORIDE SERPL-SCNC: 104 MMOL/L (ref 98–107)
CHLORIDE SERPL-SCNC: 106 MMOL/L (ref 98–107)
CHLORIDE SERPL-SCNC: 106 MMOL/L (ref 98–107)
CHLORIDE SERPL-SCNC: 95 MMOL/L (ref 98–107)
COLOR UR AUTO: COLORLESS
CREAT SERPL-MCNC: 0.41 MG/DL (ref 0.51–0.95)
CREAT SERPL-MCNC: 0.48 MG/DL (ref 0.51–0.95)
CREAT SERPL-MCNC: 0.49 MG/DL (ref 0.51–0.95)
CREAT SERPL-MCNC: 0.52 MG/DL (ref 0.51–0.95)
DEPRECATED HCO3 PLAS-SCNC: 11 MMOL/L (ref 22–29)
DEPRECATED HCO3 PLAS-SCNC: 13 MMOL/L (ref 22–29)
DEPRECATED HCO3 PLAS-SCNC: 14 MMOL/L (ref 22–29)
DEPRECATED HCO3 PLAS-SCNC: 14 MMOL/L (ref 22–29)
EOSINOPHIL # BLD AUTO: 0.1 10E3/UL (ref 0–0.7)
EOSINOPHIL NFR BLD AUTO: 1 %
ERYTHROCYTE [DISTWIDTH] IN BLOOD BY AUTOMATED COUNT: 12.8 % (ref 10–15)
GFR SERPL CREATININE-BSD FRML MDRD: >90 ML/MIN/1.73M2
GLUCOSE BLDC GLUCOMTR-MCNC: 124 MG/DL (ref 70–99)
GLUCOSE BLDC GLUCOMTR-MCNC: 176 MG/DL (ref 70–99)
GLUCOSE BLDC GLUCOMTR-MCNC: 195 MG/DL (ref 70–99)
GLUCOSE BLDC GLUCOMTR-MCNC: 248 MG/DL (ref 70–99)
GLUCOSE BLDC GLUCOMTR-MCNC: 278 MG/DL (ref 70–99)
GLUCOSE BLDC GLUCOMTR-MCNC: 322 MG/DL (ref 70–99)
GLUCOSE BLDC GLUCOMTR-MCNC: 453 MG/DL (ref 70–99)
GLUCOSE BLDC GLUCOMTR-MCNC: >600 MG/DL (ref 70–99)
GLUCOSE SERPL-MCNC: 214 MG/DL (ref 70–99)
GLUCOSE SERPL-MCNC: 253 MG/DL (ref 70–99)
GLUCOSE SERPL-MCNC: 279 MG/DL (ref 70–99)
GLUCOSE SERPL-MCNC: 579 MG/DL (ref 70–99)
GLUCOSE UR STRIP-MCNC: >1000 MG/DL
HBA1C MFR BLD: 14.2 %
HCG UR QL: NEGATIVE
HCO3 BLDV-SCNC: 13 MMOL/L (ref 24–30)
HCT VFR BLD AUTO: 38.1 % (ref 35–47)
HGB BLD-MCNC: 13.3 G/DL (ref 11.7–15.7)
HGB UR QL STRIP: ABNORMAL
IMM GRANULOCYTES # BLD: 0 10E3/UL
IMM GRANULOCYTES NFR BLD: 0 %
KETONES BLD-SCNC: 4.5 MMOL/L (ref 0–0.6)
KETONES UR STRIP-MCNC: 150 MG/DL
LACTATE SERPL-SCNC: 0.8 MMOL/L (ref 0.7–2)
LEUKOCYTE ESTERASE UR QL STRIP: ABNORMAL
LYMPHOCYTES # BLD AUTO: 1.4 10E3/UL (ref 0.8–5.3)
LYMPHOCYTES NFR BLD AUTO: 14 %
MAGNESIUM SERPL-MCNC: 1.5 MG/DL (ref 1.7–2.3)
MCH RBC QN AUTO: 31.2 PG (ref 26.5–33)
MCHC RBC AUTO-ENTMCNC: 34.9 G/DL (ref 31.5–36.5)
MCV RBC AUTO: 89 FL (ref 78–100)
MONOCYTES # BLD AUTO: 1 10E3/UL (ref 0–1.3)
MONOCYTES NFR BLD AUTO: 10 %
MUCOUS THREADS #/AREA URNS LPF: PRESENT /LPF
NEUTROPHILS # BLD AUTO: 7.1 10E3/UL (ref 1.6–8.3)
NEUTROPHILS NFR BLD AUTO: 75 %
NITRATE UR QL: NEGATIVE
NRBC # BLD AUTO: 0 10E3/UL
NRBC BLD AUTO-RTO: 0 /100
OXYHGB MFR BLDV: 89.2 % (ref 70–75)
PCO2 BLDV: 28 MM HG (ref 35–50)
PH BLDV: 7.18 [PH] (ref 7.35–7.45)
PH UR STRIP: 5 [PH] (ref 5–7)
PHOSPHATE SERPL-MCNC: 1.7 MG/DL (ref 2.5–4.5)
PLATELET # BLD AUTO: 287 10E3/UL (ref 150–450)
PO2 BLDV: 65 MM HG (ref 25–47)
POTASSIUM SERPL-SCNC: 2.8 MMOL/L (ref 3.4–5.3)
POTASSIUM SERPL-SCNC: 2.9 MMOL/L (ref 3.4–5.3)
POTASSIUM SERPL-SCNC: 3.8 MMOL/L (ref 3.4–5.3)
POTASSIUM SERPL-SCNC: 4 MMOL/L (ref 3.4–5.3)
PROT SERPL-MCNC: 6.7 G/DL (ref 6.4–8.3)
RBC # BLD AUTO: 4.26 10E6/UL (ref 3.8–5.2)
RBC URINE: 4 /HPF
SAO2 % BLDV: 91.1 % (ref 70–75)
SARS-COV-2 RNA RESP QL NAA+PROBE: NEGATIVE
SODIUM SERPL-SCNC: 127 MMOL/L (ref 136–145)
SODIUM SERPL-SCNC: 130 MMOL/L (ref 136–145)
SODIUM SERPL-SCNC: 133 MMOL/L (ref 136–145)
SODIUM SERPL-SCNC: 135 MMOL/L (ref 136–145)
SP GR UR STRIP: 1.03 (ref 1–1.03)
SQUAMOUS EPITHELIAL: 1 /HPF
UROBILINOGEN UR STRIP-MCNC: <2 MG/DL
WBC # BLD AUTO: 9.6 10E3/UL (ref 4–11)
WBC URINE: 13 /HPF

## 2022-09-25 PROCEDURE — 82010 KETONE BODYS QUAN: CPT | Performed by: NURSE PRACTITIONER

## 2022-09-25 PROCEDURE — 36415 COLL VENOUS BLD VENIPUNCTURE: CPT | Performed by: NURSE PRACTITIONER

## 2022-09-25 PROCEDURE — 36415 COLL VENOUS BLD VENIPUNCTURE: CPT | Performed by: INTERNAL MEDICINE

## 2022-09-25 PROCEDURE — 83735 ASSAY OF MAGNESIUM: CPT | Performed by: INTERNAL MEDICINE

## 2022-09-25 PROCEDURE — 81025 URINE PREGNANCY TEST: CPT | Performed by: EMERGENCY MEDICINE

## 2022-09-25 PROCEDURE — 250N000013 HC RX MED GY IP 250 OP 250 PS 637: Performed by: INTERNAL MEDICINE

## 2022-09-25 PROCEDURE — C9803 HOPD COVID-19 SPEC COLLECT: HCPCS

## 2022-09-25 PROCEDURE — 96366 THER/PROPH/DIAG IV INF ADDON: CPT

## 2022-09-25 PROCEDURE — U0005 INFEC AGEN DETEC AMPLI PROBE: HCPCS | Performed by: NURSE PRACTITIONER

## 2022-09-25 PROCEDURE — 96368 THER/DIAG CONCURRENT INF: CPT

## 2022-09-25 PROCEDURE — 80053 COMPREHEN METABOLIC PANEL: CPT | Performed by: NURSE PRACTITIONER

## 2022-09-25 PROCEDURE — 99285 EMERGENCY DEPT VISIT HI MDM: CPT | Mod: 25

## 2022-09-25 PROCEDURE — 83036 HEMOGLOBIN GLYCOSYLATED A1C: CPT | Performed by: NURSE PRACTITIONER

## 2022-09-25 PROCEDURE — 99223 1ST HOSP IP/OBS HIGH 75: CPT | Performed by: INTERNAL MEDICINE

## 2022-09-25 PROCEDURE — 82805 BLOOD GASES W/O2 SATURATION: CPT | Performed by: NURSE PRACTITIONER

## 2022-09-25 PROCEDURE — 87086 URINE CULTURE/COLONY COUNT: CPT | Performed by: EMERGENCY MEDICINE

## 2022-09-25 PROCEDURE — 250N000011 HC RX IP 250 OP 636: Performed by: NURSE PRACTITIONER

## 2022-09-25 PROCEDURE — 84100 ASSAY OF PHOSPHORUS: CPT | Performed by: INTERNAL MEDICINE

## 2022-09-25 PROCEDURE — 258N000003 HC RX IP 258 OP 636: Performed by: INTERNAL MEDICINE

## 2022-09-25 PROCEDURE — 96372 THER/PROPH/DIAG INJ SC/IM: CPT | Performed by: NURSE PRACTITIONER

## 2022-09-25 PROCEDURE — 250N000012 HC RX MED GY IP 250 OP 636 PS 637: Performed by: INTERNAL MEDICINE

## 2022-09-25 PROCEDURE — 10060 I&D ABSCESS SIMPLE/SINGLE: CPT

## 2022-09-25 PROCEDURE — 82248 BILIRUBIN DIRECT: CPT | Performed by: INTERNAL MEDICINE

## 2022-09-25 PROCEDURE — 258N000002 HC RX IP 258 OP 250: Performed by: NURSE PRACTITIONER

## 2022-09-25 PROCEDURE — 85025 COMPLETE CBC W/AUTO DIFF WBC: CPT | Performed by: NURSE PRACTITIONER

## 2022-09-25 PROCEDURE — 250N000012 HC RX MED GY IP 250 OP 636 PS 637: Performed by: NURSE PRACTITIONER

## 2022-09-25 PROCEDURE — 96361 HYDRATE IV INFUSION ADD-ON: CPT

## 2022-09-25 PROCEDURE — 82310 ASSAY OF CALCIUM: CPT | Performed by: INTERNAL MEDICINE

## 2022-09-25 PROCEDURE — 250N000011 HC RX IP 250 OP 636: Performed by: INTERNAL MEDICINE

## 2022-09-25 PROCEDURE — 250N000009 HC RX 250: Performed by: NURSE PRACTITIONER

## 2022-09-25 PROCEDURE — 96375 TX/PRO/DX INJ NEW DRUG ADDON: CPT

## 2022-09-25 PROCEDURE — 96365 THER/PROPH/DIAG IV INF INIT: CPT

## 2022-09-25 PROCEDURE — 258N000003 HC RX IP 258 OP 636: Performed by: NURSE PRACTITIONER

## 2022-09-25 PROCEDURE — 200N000001 HC R&B ICU

## 2022-09-25 PROCEDURE — 99207 PR INPT ADMISSION FROM CLINIC: CPT | Performed by: NURSE PRACTITIONER

## 2022-09-25 PROCEDURE — 83605 ASSAY OF LACTIC ACID: CPT | Performed by: NURSE PRACTITIONER

## 2022-09-25 PROCEDURE — 81001 URINALYSIS AUTO W/SCOPE: CPT | Performed by: EMERGENCY MEDICINE

## 2022-09-25 RX ORDER — POTASSIUM CHLORIDE 20MEQ/15ML
20 LIQUID (ML) ORAL ONCE
Status: DISCONTINUED | OUTPATIENT
Start: 2022-09-25 | End: 2022-09-25

## 2022-09-25 RX ORDER — ACETAMINOPHEN 325 MG/1
650 TABLET ORAL EVERY 4 HOURS PRN
Status: DISCONTINUED | OUTPATIENT
Start: 2022-09-25 | End: 2022-09-28 | Stop reason: HOSPADM

## 2022-09-25 RX ORDER — PIPERACILLIN SODIUM, TAZOBACTAM SODIUM 3; .375 G/15ML; G/15ML
3.38 INJECTION, POWDER, LYOPHILIZED, FOR SOLUTION INTRAVENOUS ONCE
Status: COMPLETED | OUTPATIENT
Start: 2022-09-25 | End: 2022-09-25

## 2022-09-25 RX ORDER — SODIUM CHLORIDE 450 MG/100ML
1000 INJECTION, SOLUTION INTRAVENOUS CONTINUOUS
Status: DISCONTINUED | OUTPATIENT
Start: 2022-09-25 | End: 2022-09-25

## 2022-09-25 RX ORDER — NALOXONE HYDROCHLORIDE 0.4 MG/ML
0.2 INJECTION, SOLUTION INTRAMUSCULAR; INTRAVENOUS; SUBCUTANEOUS
Status: DISCONTINUED | OUTPATIENT
Start: 2022-09-25 | End: 2022-09-28 | Stop reason: HOSPADM

## 2022-09-25 RX ORDER — MAGNESIUM SULFATE 4 G/50ML
4 INJECTION INTRAVENOUS ONCE
Status: COMPLETED | OUTPATIENT
Start: 2022-09-25 | End: 2022-09-25

## 2022-09-25 RX ORDER — MULTIVITAMIN,THERAPEUTIC
1 TABLET ORAL DAILY
COMMUNITY

## 2022-09-25 RX ORDER — DEXTROSE MONOHYDRATE, SODIUM CHLORIDE, AND POTASSIUM CHLORIDE 50; 1.49; 4.5 G/1000ML; G/1000ML; G/1000ML
INJECTION, SOLUTION INTRAVENOUS CONTINUOUS
Status: DISCONTINUED | OUTPATIENT
Start: 2022-09-25 | End: 2022-09-26

## 2022-09-25 RX ORDER — POTASSIUM CHLORIDE 20MEQ/15ML
40 LIQUID (ML) ORAL ONCE
Status: DISCONTINUED | OUTPATIENT
Start: 2022-09-25 | End: 2022-09-25

## 2022-09-25 RX ORDER — POTASSIUM CHLORIDE 1.5 G/1.58G
40 POWDER, FOR SOLUTION ORAL ONCE
Status: COMPLETED | OUTPATIENT
Start: 2022-09-25 | End: 2022-09-25

## 2022-09-25 RX ORDER — NALOXONE HYDROCHLORIDE 0.4 MG/ML
0.4 INJECTION, SOLUTION INTRAMUSCULAR; INTRAVENOUS; SUBCUTANEOUS
Status: DISCONTINUED | OUTPATIENT
Start: 2022-09-25 | End: 2022-09-28 | Stop reason: HOSPADM

## 2022-09-25 RX ORDER — POTASSIUM CHLORIDE 1.5 G/1.58G
20 POWDER, FOR SOLUTION ORAL ONCE
Status: COMPLETED | OUTPATIENT
Start: 2022-09-25 | End: 2022-09-25

## 2022-09-25 RX ORDER — NICOTINE POLACRILEX 4 MG
15-30 LOZENGE BUCCAL
Status: DISCONTINUED | OUTPATIENT
Start: 2022-09-25 | End: 2022-09-28 | Stop reason: HOSPADM

## 2022-09-25 RX ORDER — DEXTROSE MONOHYDRATE 25 G/50ML
25-50 INJECTION, SOLUTION INTRAVENOUS
Status: DISCONTINUED | OUTPATIENT
Start: 2022-09-25 | End: 2022-09-28 | Stop reason: HOSPADM

## 2022-09-25 RX ORDER — PIPERACILLIN SODIUM, TAZOBACTAM SODIUM 3; .375 G/15ML; G/15ML
3.38 INJECTION, POWDER, LYOPHILIZED, FOR SOLUTION INTRAVENOUS EVERY 8 HOURS
Status: DISCONTINUED | OUTPATIENT
Start: 2022-09-25 | End: 2022-09-28 | Stop reason: HOSPADM

## 2022-09-25 RX ADMIN — POTASSIUM CHLORIDE 40 MEQ: 1.5 POWDER, FOR SOLUTION ORAL at 21:50

## 2022-09-25 RX ADMIN — SODIUM CHLORIDE 1000 ML: 9 INJECTION, SOLUTION INTRAVENOUS at 13:56

## 2022-09-25 RX ADMIN — POTASSIUM & SODIUM PHOSPHATES POWDER PACK 280-160-250 MG 2 PACKET: 280-160-250 PACK at 21:50

## 2022-09-25 RX ADMIN — HYDROMORPHONE HYDROCHLORIDE 1 MG: 1 INJECTION, SOLUTION INTRAMUSCULAR; INTRAVENOUS; SUBCUTANEOUS at 13:16

## 2022-09-25 RX ADMIN — INSULIN HUMAN 100 UNITS: 1 INJECTION, SOLUTION INTRAVENOUS at 15:21

## 2022-09-25 RX ADMIN — INSULIN ASPART 10 UNITS: 100 INJECTION, SOLUTION INTRAVENOUS; SUBCUTANEOUS at 14:21

## 2022-09-25 RX ADMIN — POTASSIUM CHLORIDE, DEXTROSE MONOHYDRATE AND SODIUM CHLORIDE: 150; 5; 450 INJECTION, SOLUTION INTRAVENOUS at 17:33

## 2022-09-25 RX ADMIN — INSULIN GLARGINE 5 UNITS: 100 INJECTION, SOLUTION SUBCUTANEOUS at 20:39

## 2022-09-25 RX ADMIN — SODIUM CHLORIDE 1000 ML: 9 INJECTION, SOLUTION INTRAVENOUS at 15:17

## 2022-09-25 RX ADMIN — PIPERACILLIN AND TAZOBACTAM 3.38 G: 3; .375 INJECTION, POWDER, LYOPHILIZED, FOR SOLUTION INTRAVENOUS at 14:49

## 2022-09-25 RX ADMIN — MAGNESIUM SULFATE HEPTAHYDRATE 4 G: 4 INJECTION, SOLUTION INTRAVENOUS at 21:39

## 2022-09-25 RX ADMIN — PIPERACILLIN AND TAZOBACTAM 3.38 G: 3; .375 INJECTION, POWDER, LYOPHILIZED, FOR SOLUTION INTRAVENOUS at 21:10

## 2022-09-25 RX ADMIN — SODIUM CHLORIDE 1000 ML: 4.5 INJECTION, SOLUTION INTRAVENOUS at 18:34

## 2022-09-25 RX ADMIN — ACETAMINOPHEN 650 MG: 325 TABLET, FILM COATED ORAL at 21:41

## 2022-09-25 RX ADMIN — POTASSIUM CHLORIDE 20 MEQ: 1.5 POWDER, FOR SOLUTION ORAL at 23:13

## 2022-09-25 ASSESSMENT — ACTIVITIES OF DAILY LIVING (ADL)
WALKING_OR_CLIMBING_STAIRS_DIFFICULTY: NO
HEARING_DIFFICULTY_OR_DEAF: NO
DRESSING/BATHING_DIFFICULTY: NO
FALL_HISTORY_WITHIN_LAST_SIX_MONTHS: NO
DIFFICULTY_EATING/SWALLOWING: NO
DOING_ERRANDS_INDEPENDENTLY_DIFFICULTY: NO
CHANGE_IN_FUNCTIONAL_STATUS_SINCE_ONSET_OF_CURRENT_ILLNESS/INJURY: NO
ADLS_ACUITY_SCORE: 20
DIFFICULTY_EATING/SWALLOWING: NO
WEAR_GLASSES_OR_BLIND: YES
CONCENTRATING,_REMEMBERING_OR_MAKING_DECISIONS_DIFFICULTY: NO
ADLS_ACUITY_SCORE: 20
TOILETING_ISSUES: NO
TOILETING_ISSUES: NO
CHANGE_IN_FUNCTIONAL_STATUS_SINCE_ONSET_OF_CURRENT_ILLNESS/INJURY: NO
ADLS_ACUITY_SCORE: 33
FALL_HISTORY_WITHIN_LAST_SIX_MONTHS: NO
WEAR_GLASSES_OR_BLIND: YES
WALKING_OR_CLIMBING_STAIRS_DIFFICULTY: NO
ADLS_ACUITY_SCORE: 35
HEARING_DIFFICULTY_OR_DEAF: NO
DOING_ERRANDS_INDEPENDENTLY_DIFFICULTY: NO
CONCENTRATING,_REMEMBERING_OR_MAKING_DECISIONS_DIFFICULTY: NO
DIFFICULTY_COMMUNICATING: NO
ADLS_ACUITY_SCORE: 35
ADLS_ACUITY_SCORE: 35

## 2022-09-25 NOTE — ED NOTES
Emergency Department Midlevel Supervisory Note     I personally saw the patient and performed a substantive portion of the visit including all aspects of the medical decision making.    ED Course:  2:25 PM  José Manuel Rasheed CNP staffed patient with me. I agree with their assessment and plan of management, and I will see the patient.  2:33 PM I met with the patient to introduce myself, gather additional history, perform my initial exam, and discuss the plan. PPE: Provider wore gloves and paper mask.     22 year old female, history of IDDM, who presents for evaluation of perirectal abscess that has been worsening since onset 4 days ago. No systemic symptoms or fevers.  Given immunosuppression with diabetes, patient given IV Zosyn.    NP Wili performed incision and drainage (please refer to his note).  On exam post I&D, there is mild induration and tenderness to palpation with no palpable fluctuance.    POCT blood sugar > 600 for which IV access was placed, blood sent for labs and IV fluids initiated.    Labs consistent with DKA with serum glucose of 579 and associated acidosis (bicarb 11, VBG pH 7.18), elevated anion gap (21) and elevated ketones (4.5).    Potassium WNL (4.0) and insulin then initiated.    Lactate normal (0.8).    Given DKA and need for insulin drip, patient admitted to the ICU.  Patient hemodynamically stable throughout ED course.    FINAL IMPRESSION:    ICD-10-CM    1. Perirectal abscess  K61.1 Case Request: INCISION AND DRAINAGE, ABSCESS, RECTUM     Case Request: INCISION AND DRAINAGE, ABSCESS, RECTUM   2. DKA (diabetic ketoacidosis) (H)  E11.10        MEDICATIONS GIVEN IN THE EMERGENCY DEPARTMENT:  Medications   dextrose 50 % injection 25-50 mL (has no administration in time range)   piperacillin-tazobactam (ZOSYN) 3.375 g vial to attach to  mL bag (3.375 g Intravenous Given 9/26/22 1228)   glucose gel 15-30 g (has no administration in time range)     Or   dextrose 50 % injection 25-50  mL (has no administration in time range)     Or   glucagon injection 1 mg (has no administration in time range)   insulin aspart (NovoLOG) injection (RAPID ACTING) (3 Units Subcutaneous Given 9/26/22 0934)   insulin aspart (NovoLOG) injection (RAPID ACTING) (has no administration in time range)   insulin aspart (NovoLOG) injection (RAPID ACTING) (7 Units Subcutaneous Given 9/25/22 2242)   insulin aspart (NovoLOG) injection (RAPID ACTING) (2 Units Subcutaneous Given 9/26/22 0810)   insulin aspart (NovoLOG) injection (RAPID ACTING) (1 Units Subcutaneous Not Given 9/25/22 2009)   insulin glargine (LANTUS PEN) injection 25 Units (25 Units Subcutaneous Given 9/26/22 0809)   acetaminophen (TYLENOL) tablet 650 mg (650 mg Oral Given 9/26/22 0258)   oxyCODONE IR (ROXICODONE) half-tab 2.5-5 mg (5 mg Oral Given 9/26/22 1208)   naloxone (NARCAN) injection 0.2 mg (has no administration in time range)     Or   naloxone (NARCAN) injection 0.4 mg (has no administration in time range)     Or   naloxone (NARCAN) injection 0.2 mg (has no administration in time range)     Or   naloxone (NARCAN) injection 0.4 mg (has no administration in time range)   potassium & sodium phosphates (NEUTRA-PHOS) Packet 1 packet (1 packet Oral or Feeding Tube Given 9/26/22 1029)   potassium chloride (KLOR-CON) Packet 20 mEq (20 mEq Oral Given 9/26/22 1031)   lidocaine 1% with EPINEPHrine 1:100,000 injection 10 mL (has no administration in time range)   silver nitrate (ARZOL) Misc (has no administration in time range)   HYDROmorphone (DILAUDID) injection 1 mg (1 mg Intramuscular Given 9/25/22 1316)   0.9% sodium chloride BOLUS (0 mLs Intravenous Stopped 9/25/22 1441)   insulin aspart (NovoLOG) injection (RAPID ACTING) (10 Units Subcutaneous Given 9/25/22 1421)   piperacillin-tazobactam (ZOSYN) 3.375 g vial to attach to  mL bag (3.375 g Intravenous Given 9/25/22 1449)   0.9% sodium chloride BOLUS (0 mLs Intravenous Stopped 9/25/22 3089)   insulin  glargine (LANTUS PEN) injection 5 Units (5 Units Subcutaneous Given 9/25/22 2039)   magnesium sulfate 4 g in 50 mL sterile water (premade) (4 g Intravenous New Bag 9/25/22 2139)   potassium & sodium phosphates (NEUTRA-PHOS) Packet 2 packet (2 packets Oral or Feeding Tube Given 9/26/22 0616)   potassium chloride (KLOR-CON) Packet 40 mEq (40 mEq Oral or Feeding Tube Given 9/25/22 2150)   potassium chloride (KLOR-CON) Packet 20 mEq (20 mEq Oral or Feeding Tube Given 9/25/22 2313)   potassium chloride ER (KLOR-CON M) CR tablet 40 mEq (40 mEq Oral Given 9/26/22 0616)       NEW PRESCRIPTIONS STARTED AT TODAY'S ED VISIT:  Current Discharge Medication List            CHIEF COMPLAINT:  rectal abscess      Triage Note:The pt presents as a referral from the clinic for evaluation of a rectal abscess near her rectum. She reports taking ibuprofen 2 hours ago and pain is well tolerated at this time.         HPI     HPI     Kimberly Patterson is a 22 year old female, history of insulin-dependent diabetes, who presents for evaluation of perirectal abscess.    She awoke Thursday (4 days ago) with swelling in the perirectal area that has gotten progressively larger and more painful since onset.  Pain feels like a pressure and is worse with sitting.  She denies associated fever.      She reports poor appetite, however otherwise denies chest pain, shortness of breath, abdominal pain, N/V/D, urinary symptoms, cough or other concerns.    She reports that she intermittently keeps her blood sugars under tight control; sugars recently have been running ~200-350 as she has not been taking very good care of her diabetes.      REVIEW OF SYSTEMS:  All other systems reviewed and are negative.      Medical History     Past Medical History:   Diagnosis Date     Anxiety      Depressive disorder      Diabetes (H)      Pyelonephritis        Past Surgical History:   Procedure Laterality Date     CARDIAC SURGERY      2017- [place]. Congenital heart condition.  "      Family History   Problem Relation Age of Onset     Depression Mother      Anxiety Disorder Mother      Alcoholism Mother      Multiple Sclerosis Mother      Depression Father      Anxiety Disorder Father      Alcoholism Father      No Known Problems Sister        Social History     Tobacco Use     Smoking status: Never Smoker     Smokeless tobacco: Never Used   Vaping Use     Vaping Use: Every day   Substance Use Topics     Alcohol use: Yes     Comment: occasional     Drug use: Never       No current outpatient medications on file.      Physical Exam     First Vitals:  Patient Vitals for the past 24 hrs:   BP Temp Temp src Pulse Resp SpO2 Height Weight   09/26/22 1300 116/75 -- -- 103 -- 99 % -- --   09/26/22 1200 112/74 98.1  F (36.7  C) Oral 93 -- 100 % -- --   09/26/22 1100 108/72 -- -- 96 -- 98 % -- --   09/26/22 1000 107/71 -- -- 99 -- 99 % -- --   09/26/22 0900 96/68 -- -- 86 -- 99 % -- --   09/26/22 0800 (!) 87/52 98.4  F (36.9  C) Oral 80 -- 99 % -- --   09/26/22 0700 (!) 87/51 -- -- 82 -- 99 % -- --   09/26/22 0600 96/53 98  F (36.7  C) Oral 78 -- 98 % -- --   09/26/22 0400 99/57 -- -- 82 -- 98 % -- --   09/26/22 0300 100/68 -- -- 84 -- 100 % -- --   09/26/22 0200 94/59 -- -- 78 -- 99 % -- --   09/26/22 0100 93/54 -- -- 83 -- 98 % -- --   09/26/22 0000 95/56 99  F (37.2  C) Oral 92 18 98 % -- --   09/25/22 2000 109/56 100.1  F (37.8  C) Oral 109 18 100 % -- --   09/25/22 1800 -- -- -- -- -- 100 % 1.651 m (5' 5\") 60.3 kg (133 lb)   09/25/22 1759 -- -- -- 110 -- -- -- --   09/25/22 1715 -- -- -- 111 18 -- -- --   09/25/22 1515 114/69 98.4  F (36.9  C) Oral 89 18 100 % 1.651 m (5' 5\") 57.1 kg (125 lb 12.8 oz)   09/25/22 1430 114/60 -- -- 99 -- 99 % -- --   09/25/22 1400 114/73 -- -- 89 -- 99 % -- --       PHYSICAL EXAM:   Physical Exam    GENERAL: Awake, alert.  In no acute distress.   HEENT: Normocephalic, atraumatic. Pupils equal, round and reactive. Conjunctiva normal.  NECK: No stridor.  PULMONARY: " Symmetrical breath sounds without distress.  Lungs clear to auscultation bilaterally without wheezes, rhonchi or rales.  CARDIO: Regular rate and rhythm.  No significant murmur, rub or gallop.    ABDOMINAL: Abdomen soft, non-distended and non-tender to palpation.    BUTTOCKS: Incision and drainage had been performed by YUMIKO Rasheed; there is mild induration and tenderness to palpation, however no palpable fluctuance.  EXTREMITIES: No lower extremity swelling or edema.      NEURO: Alert and oriented to person, place and time.  Cranial nerves grossly intact.  No focal motor deficit.  PSYCH: Normal mood and affect.  SKIN: No rashes.     Results     LAB:  All pertinent labs reviewed and interpreted  Labs Ordered and Resulted from Time of ED Arrival to Time of ED Departure   GLUCOSE BY METER - Abnormal       Result Value    GLUCOSE BY METER POCT >600 (*)    BASIC METABOLIC PANEL - Abnormal    Sodium 127 (*)     Potassium 4.0      Chloride 95 (*)     Carbon Dioxide (CO2) 11 (*)     Anion Gap 21 (*)     Urea Nitrogen 9.4      Creatinine 0.49 (*)     Calcium 8.8      Glucose 579 (*)     GFR Estimate >90     BLOOD GAS VENOUS - Abnormal    pH Venous 7.18 (*)     pCO2 Venous 28 (*)     pO2 Venous 65 (*)     Bicarbonate Venous 13 (*)     Base Excess/Deficit (+/-) -17.9      Oxyhemoglobin Venous 89.2 (*)     O2 Sat, Venous 91.1 (*)    KETONE BETA-HYDROXYBUTYRATE QUANTITATIVE, RAPID - Abnormal    Ketone (Beta-Hydroxybutyrate) Quantitative 4.5 (*)    ROUTINE UA WITH MICROSCOPIC REFLEX TO CULTURE - Abnormal    Color Urine Colorless      Appearance Urine Clear      Glucose Urine >1000 (*)     Bilirubin Urine Negative      Ketones Urine 150  (*)     Specific Gravity Urine 1.031 (*)     Blood Urine 0.03 mg/dL (*)     pH Urine 5.0      Protein Albumin Urine Negative      Urobilinogen Urine <2.0      Nitrite Urine Negative      Leukocyte Esterase Urine 250 Arianna/uL (*)     Bacteria Urine Few (*)     Mucus Urine Present (*)     RBC Urine 4  (*)     WBC Urine 13 (*)     Squamous Epithelials Urine 1     HEMOGLOBIN A1C - Abnormal    Hemoglobin A1C 14.2 (*)    GLUCOSE BY METER - Abnormal    GLUCOSE BY METER POCT 453 (*)    BASIC METABOLIC PANEL - Abnormal    Sodium 135 (*)     Potassium 2.9 (*)     Chloride 106      Carbon Dioxide (CO2) 13 (*)     Anion Gap 16 (*)     Urea Nitrogen 7.2      Creatinine 0.52      Calcium 8.4 (*)     Glucose 279 (*)     GFR Estimate >90     PHOSPHORUS - Abnormal    Phosphorus 1.7 (*)    MAGNESIUM - Abnormal    Magnesium 1.5 (*)    HEPATIC FUNCTION PANEL - Abnormal    Protein Total 6.7      Albumin 3.6      Bilirubin Total 0.3      Alkaline Phosphatase 179 (*)     AST 13      ALT 7 (*)     Bilirubin Direct <0.20     GLUCOSE BY METER - Abnormal    GLUCOSE BY METER POCT 278 (*)    GLUCOSE BY METER - Abnormal    GLUCOSE BY METER POCT 195 (*)    GLUCOSE BY METER - Abnormal    GLUCOSE BY METER POCT 322 (*)    LACTIC ACID WHOLE BLOOD - Normal    Lactic Acid 0.8     HCG QUALITATIVE URINE - Normal    hCG Urine Qualitative Negative     COVID-19 VIRUS (CORONAVIRUS) BY PCR - Normal    SARS CoV2 PCR Negative     CBC WITH PLATELETS AND DIFFERENTIAL    WBC Count 9.6      RBC Count 4.26      Hemoglobin 13.3      Hematocrit 38.1      MCV 89      MCH 31.2      MCHC 34.9      RDW 12.8      Platelet Count 287      % Neutrophils 75      % Lymphocytes 14      % Monocytes 10      % Eosinophils 1      % Basophils 0      % Immature Granulocytes 0      NRBCs per 100 WBC 0      Absolute Neutrophils 7.1      Absolute Lymphocytes 1.4      Absolute Monocytes 1.0      Absolute Eosinophils 0.1      Absolute Basophils 0.0      Absolute Immature Granulocytes 0.0      Absolute NRBCs 0.0     URINE CULTURE       I, Dat Jay, am serving as a scribe to document services personally performed by Carole Nuñez MD based on my observation and the provider's statements to me. I, Carole Nuñez MD attest that Dat Jay is acting in a scribe capacity, has  observed my performance of the services and has documented them in accordance with my direction.    Carole Nuñez MD  Emergency Medicine  [facility] EMERGENCY DEPARTMENT         Carole Nuñez MD  09/26/22 4700

## 2022-09-25 NOTE — ED PROVIDER NOTES
EMERGENCY DEPARTMENT ENCOUNTER      NAME: Kimberly Patterson  AGE: 22 year old female  YOB: 2000  MRN: [id number]  EVALUATION DATE & TIME: 2022 12:44 PM    PCP: Emily Moyer    ED PROVIDER: JESSICA Guo CNP      Chief Complaint   Patient presents with     rectal abscess         FINAL IMPRESSION:  1. Perirectal abscess    2. DKA (diabetic ketoacidosis) (H)          ED COURSE & MEDICAL DECISION MAKIN:55 PM I met with the patient, obtained history, performed an initial exam, and discussed options and plan for treatment here in the ED.  2:15 PM performed incision and drainage  2:26 PM case staffed with Dr Nuñez  3:03 PM updated patient  3:14 PM case discussed with hospitalist Dr Jacobson    Pertinent Labs & Imaging studies reviewed. (See chart for details)  22 year old female presents to the Emergency Department for evaluation of perirectal abscess.  This was easily identifiable and easily drained.  Patient requested having her blood sugar checked.  This was over 600.  Follow-up metabolic panel indicated acidosis.  Was given subcutaneous insulin for elevated blood sugar, order DKA labs and after labs confirm DKA was started on DKA protocol. given IV Zosyn for the perirectal abscess/infection. Packing placed. Can be removed in 2-3 days.  Does not appear septic. Will be admitted for ongoing care.      Critical Care     Performed by: José Manuel Rasheed CNP  Authorized by: Dr Carole Nuñez  Total critical care time: 30 minutes  Critical care was necessary to treat or prevent imminent or life-threatening deterioration of the following conditions: DKA  Critical care was time spent personally by me on the following activities: development of treatment plan with patient or surrogate, discussions with consultants, examination of patient, evaluation of patient's response to treatment, obtaining history from patient or surrogate, ordering and performing treatments and interventions, ordering and  review of laboratory studies, ordering and review of radiographic studies, re-evaluation of patient's condition and monitoring for potential decompensation.  Critical care time was exclusive of separately billable procedures and treating other patients.      At the conclusion of the encounter I discussed the results of all of the tests and the disposition. The questions were answered. The patient or family acknowledged understanding and was agreeable with the care plan.       MEDICATIONS GIVEN IN THE EMERGENCY:  Medications   piperacillin-tazobactam (ZOSYN) 3.375 g vial to attach to  mL bag (3.375 g Intravenous Given 9/25/22 1449)   dextrose 5% and 0.45% NaCl infusion (has no administration in time range)   dextrose 50 % injection 25-50 mL (has no administration in time range)   0.9% sodium chloride BOLUS (has no administration in time range)     Followed by   0.45% sodium chloride infusion (has no administration in time range)   insulin regular (MYXREDLIN) 1 unit/mL infusion (has no administration in time range)   HYDROmorphone (DILAUDID) injection 1 mg (1 mg Intramuscular Given 9/25/22 1316)   0.9% sodium chloride BOLUS (0 mLs Intravenous Stopped 9/25/22 1441)   insulin aspart (NovoLOG) injection (RAPID ACTING) (10 Units Subcutaneous Given 9/25/22 1421)       NEW PRESCRIPTIONS STARTED AT TODAY'S ER VISIT  New Prescriptions    No medications on file            =================================================================    HPI    Patient information was obtained from: patient    Use of Intrepreter: N/A        Kimberly Patterson is a 22 year old female with a history of insulin-dependent diabetes who presents for evaluation of perirectal abscess.  Started noting pain 2 to 3 days ago.  Has since noted redness and some swelling.  Went to urgent care and was felt she would not be able to tolerate any drainage and was sent to the ER.  Denies history of similar symptoms.  No associated fever or chills.  Stools have  "been normal.  Denies any significant change in blood sugars recently.      REVIEW OF SYSTEMS   All systems reviewed and negative except as noted in HPI.         PAST MEDICAL HISTORY:  Past Medical History:   Diagnosis Date     Anxiety      Depressive disorder      Diabetes (H)      Pyelonephritis     June 2021, 2 ER visits       PAST SURGICAL HISTORY:  Past Surgical History:   Procedure Laterality Date     CARDIAC SURGERY      2017- [place]. Congenital heart condition.           CURRENT MEDICATIONS:    Current Facility-Administered Medications   Medication     0.9% sodium chloride BOLUS    Followed by     0.45% sodium chloride infusion     dextrose 5% and 0.45% NaCl infusion     dextrose 50 % injection 25-50 mL     insulin regular (MYXREDLIN) 1 unit/mL infusion     piperacillin-tazobactam (ZOSYN) 3.375 g vial to attach to  mL bag     Current Outpatient Medications   Medication     etonogestrel (NEXPLANON) 68 MG IMPL     glucagon 1 MG kit     insulin aspart (NOVOLOG VIAL) 100 UNITS/ML vial     insulin glargine (LANTUS PEN) 100 UNIT/ML pen     multivitamin, therapeutic (THERA-VIT) TABS tablet     Acetone, Urine, Test (KETONE TEST) STRP     blood glucose (CONTOUR NEXT TEST) test strip     blood glucose monitoring (ACCU-CHEK MULTICLIX) lancets     insulin pen needle (32G X 4 MM) 32G X 4 MM miscellaneous     insulin syringe-needle U-100 (31G X 5/16\" 0.5 ML) 31G X 5/16\" 0.5 ML miscellaneous         ALLERGIES:  Allergies   Allergen Reactions     Ketorolac Difficulty breathing       FAMILY HISTORY:  Family History   Problem Relation Age of Onset     Depression Mother      Anxiety Disorder Mother      Alcoholism Mother      Multiple Sclerosis Mother      Depression Father      Anxiety Disorder Father      Alcoholism Father      No Known Problems Sister        SOCIAL HISTORY:   Social History     Socioeconomic History     Marital status: Single   Tobacco Use     Smoking status: Never Smoker     Smokeless tobacco: Never " Used   Vaping Use     Vaping Use: Every day   Substance and Sexual Activity     Alcohol use: Yes     Comment: occasional     Drug use: Never     Sexual activity: Not Currently         VITALS:  Patient Vitals for the past 24 hrs:   BP Temp Temp src Pulse Resp SpO2 Weight   22 1430 114/60 -- -- 99 -- 99 % --   22 1400 114/73 -- -- 89 -- 99 % --   22 1049 (!) 138/91 97.7  F (36.5  C) Temporal 119 16 95 % 56.7 kg (125 lb)       PHYSICAL EXAM    Constitutional:  Well developed, well nourished, no acute distress  EYES: Conjunctivae clear  HENT:  Atraumatic, normocephalic  Respiratory:  No respiratory distress. Lungs CTA  Cardiac: Tachycardic.  S1-S2.  No murmur.  Integument: Erythema, swelling, and tenderness just to the right of the lower  cleft.  There is some fluctuance.  No crepitus  Neurologic:  Alert & oriented x 3           LAB:  All pertinent labs reviewed and interpreted.  Labs Ordered and Resulted from Time of ED Arrival to Time of ED Departure   GLUCOSE BY METER - Abnormal       Result Value    GLUCOSE BY METER POCT >600 (*)    BASIC METABOLIC PANEL - Abnormal    Sodium 127 (*)     Potassium 4.0      Chloride 95 (*)     Carbon Dioxide (CO2) 11 (*)     Anion Gap 21 (*)     Urea Nitrogen 9.4      Creatinine 0.49 (*)     Calcium 8.8      Glucose 579 (*)     GFR Estimate >90     BLOOD GAS VENOUS - Abnormal    pH Venous 7.18 (*)     pCO2 Venous 28 (*)     pO2 Venous 65 (*)     Bicarbonate Venous 13 (*)     Base Excess/Deficit (+/-) -17.9      Oxyhemoglobin Venous 89.2 (*)     O2 Sat, Venous 91.1 (*)    KETONE BETA-HYDROXYBUTYRATE QUANTITATIVE, RAPID - Abnormal    Ketone (Beta-Hydroxybutyrate) Quantitative 4.5 (*)    GLUCOSE BY METER - Abnormal    GLUCOSE BY METER POCT 453 (*)    LACTIC ACID WHOLE BLOOD - Normal    Lactic Acid 0.8     CBC WITH PLATELETS AND DIFFERENTIAL    WBC Count 9.6      RBC Count 4.26      Hemoglobin 13.3      Hematocrit 38.1      MCV 89      MCH 31.2      MCHC 34.9       RDW 12.8      Platelet Count 287      % Neutrophils 75      % Lymphocytes 14      % Monocytes 10      % Eosinophils 1      % Basophils 0      % Immature Granulocytes 0      NRBCs per 100 WBC 0      Absolute Neutrophils 7.1      Absolute Lymphocytes 1.4      Absolute Monocytes 1.0      Absolute Eosinophils 0.1      Absolute Basophils 0.0      Absolute Immature Granulocytes 0.0      Absolute NRBCs 0.0     ROUTINE UA WITH MICROSCOPIC REFLEX TO CULTURE   HCG QUALITATIVE URINE   GLUCOSE MONITOR NURSING POCT   HEMOGLOBIN A1C   COVID-19 VIRUS (CORONAVIRUS) BY PCR         RADIOLOGY:  Reviewed all pertinent imaging. Please see official radiology report.  No orders to display             PROCEDURES:   PROCEDURE: Incision and Drainage   INDICATIONS: Localized abscess   PROCEDURE PROVIDER: José Manuel Rasheed CNP   SITE: perirectal   MEDICATION: 6 mLs of 1% Lidocaine with epinephrine   NOTE: The area was prepped with chlorhexidine and draped off in the usual sterile fashion.  Local anesthetic was injected subcutaneously with anesthesia effects demonstrated prior to proceeding.  The area of maximal fluctuance was opened with a # 11 Blade ([place]) using a Single Straight incision to allow for drainage.  The abscess was drained.  The abscess cavity was bluntly explored to separate any loculations. Iodoform Gauze was placed into the abscess cavity.  A sterile dressing was placed over the area.   COMPLEXITY: Complex    Simple = single, furuncle, paronychia, superficial  Complex = multiple or abscess requiring probing, loculations, packing placement   COMPLICATIONS: Patient tolerated procedure well, without complication           JESSICA Guo, CNP  Emergency Medicine  [facility] EMERGENCY DEPARTMENT  [facility] [facility] [address]  [phone]  Dept: [phone]         José Manuel Rasheed APRN CNP  09/25/22 9827

## 2022-09-25 NOTE — PHARMACY-ADMISSION MEDICATION HISTORY
Pharmacy Note - Admission Medication History    Pertinent Provider Information: patient has insulin pump     ______________________________________________________________________    Prior To Admission (PTA) med list completed and updated in EMR.       PTA Med List   Medication Sig Last Dose     etonogestrel (NEXPLANON) 68 MG IMPL 1 each by Subdermal route once      glucagon 1 MG kit Use as directed in case of low blood sugar resulting in seizure or unconsciousness More than a month at Unknown time     insulin aspart (NOVOLOG VIAL) 100 UNITS/ML vial 1 unit / 10g carbs 9/25/2022 at Unknown time     insulin glargine (LANTUS PEN) 100 UNIT/ML pen Inject 20 Units Subcutaneous every morning 9/25/2022 at am     multivitamin, therapeutic (THERA-VIT) TABS tablet Take 1 tablet by mouth daily Past Week at Unknown time       Information source(s): Patient and [facility]  Method of interview communication: in-person    Summary of Changes to PTA Med List  New: multivitamin  Discontinued: none  Changed: none    Patient was asked about OTC/herbal products specifically.  PTA med list reflects this.    In the past week, patient estimated taking medication this percent of the time:  greater than 90%.    Allergies were reviewed, assessed, and updated with the patient.      Patient does not use any multi-dose medications prior to admission.    The information provided in this note is only as accurate as the sources available at the time of the update(s).    Thank you for the opportunity to participate in the care of this patient.    Cleo Tran  9/25/2022 2:35 PM

## 2022-09-25 NOTE — ED NOTES
Expected Patient Referral to ED  10:19 AM    Referring Clinic/Provider:  [facility]    Reason for referral/Clinical facts:  Rectal pain with rectal abscess and lots of pain.Unable to tolerate much exam in order to drain.    Recommendations provided:  Send to ED for further evaluation    Caller was informed that this institution does possess the capabilities and/or resources to provide for patient and should be transferred to our facility.    Discussed that if direct admit is sought and any hurdles are encountered, this ED would be happy to see the patient and evaluate.    Informed caller that recommendations provided are recommendations based only on the facts provided and that they responsible to accept or reject the advice, or to seek a formal in person consultation as needed and that this ED will see/treat patient should they arrive.      Meek Cabello MD  Emergency Medicine  [facility] EMERGENCY DEPARTMENT  [address] [place] [id number]  [phone]     Meek Cabello MD  09/25/22 1020

## 2022-09-25 NOTE — PROGRESS NOTES
"SUBJECTIVE:   Kimberly Patterson is a 22 year old female presenting with a chief complaint of   Chief Complaint   Patient presents with     Derm Problem     Cyst/abscess in between butt cheeks on the R side, painful and hard to the touch     About 3 days ago, developed rectal pain with a \"lump\" that has fluctuated in size. Now increasingly painful. No recent BM. No fevers. No abdominal pain. No nausea or vomiting.  She is an established patient of [facility].        Review of Systems   All other systems reviewed and are negative.      Past Medical History:   Diagnosis Date     Anxiety      Depressive disorder      Diabetes (H)      Pyelonephritis     June 2021, 2 ER visits     Family History   Problem Relation Age of Onset     Depression Mother      Anxiety Disorder Mother      Alcoholism Mother      Multiple Sclerosis Mother      Depression Father      Anxiety Disorder Father      Alcoholism Father      No Known Problems Sister      Current Outpatient Medications   Medication Sig Dispense Refill     etonogestrel (NEXPLANON) 68 MG IMPL 1 each by Subdermal route once       glucagon 1 MG kit Use as directed in case of low blood sugar resulting in seizure or unconsciousness       insulin aspart (NOVOLOG VIAL) 100 UNITS/ML vial Use as directed in insulin pump. Uses approx. 75 units/day.       insulin lispro (HUMALOG PENFILL) 100 UNIT/ML Cartridge        Acetone, Urine, Test (KETONE TEST) STRP 1 each       blood glucose (CONTOUR NEXT TEST) test strip Use to test blood sugar 4-6 times per day       blood glucose monitoring (ACCU-CHEK MULTICLIX) lancets        insulin pen needle (32G X 4 MM) 32G X 4 MM miscellaneous Use as directed 4-6 needles per day       insulin syringe-needle U-100 (31G X 5/16\" 0.5 ML) 31G X 5/16\" 0.5 ML miscellaneous Use 4-6 syringes per day in case of insulin pump malfuntion       Social History     Tobacco Use     Smoking status: Never Smoker     Smokeless tobacco: Never Used   Substance Use Topics     " Alcohol use: Yes     Comment: occasional       OBJECTIVE  /80   Pulse 110   Temp 98.8  F (37.1  C) (Oral)   Resp 16   Wt 56.8 kg (125 lb 4.8 oz)   SpO2 97%   BMI 20.85 kg/m      Physical Exam  Vitals and nursing note reviewed.   Constitutional:       General: She is not in acute distress.     Appearance: Normal appearance. She is not ill-appearing or toxic-appearing.   HENT:      Head: Normocephalic and atraumatic.      Mouth/Throat:      Mouth: Mucous membranes are moist.   Cardiovascular:      Rate and Rhythm: Tachycardia present.   Pulmonary:      Effort: Pulmonary effort is normal.   Genitourinary:     Rectum: Tenderness present.      Comments: Significant erythema and induration perirectally, very tender to palpation, difficult to tolerate exam  Musculoskeletal:      Cervical back: Neck supple.   Skin:     General: Skin is warm and dry.   Neurological:      General: No focal deficit present.      Mental Status: She is alert.   Psychiatric:         Mood and Affect: Mood normal.         Behavior: Behavior normal.         Labs:  No results found for this or any previous visit (from the past 24 hour(s)).    X-Ray was not done.    ASSESSMENT:      ICD-10-CM    1. Perirectal abscess  K61.1    2. Type 1 diabetes mellitus with hyperglycemia (H)  E10.65         Medical Decision Making:  History of T1DM, evidence of perirectal abscess on exam, however no fluctuant mass amenable to drainage. Fairly significant area of induration, significant pain on exam. Mild tachycardia, however afebrile and normotenesive, low concern for sepsis. Likely needs imaging. Given hx of diabetes, working diagnosis of perirectal abscess, recommend patient be seen in ER for further evaluation. She is amenable to this, notes she will go to [facility]. Called and spoke with Dr. Cabello in [facility]'s ER.     Serious Comorbid Conditions:  Adult:  Diabetes    PLAN:        Followup:    Transfer to ED via private car    Patient Instructions    Please be seen in ER.

## 2022-09-25 NOTE — H&P
[facility]    History and Physical - Hospitalist Service       Date of Admission:  9/25/2022    Assessment & Plan      Kimberly Patterson is a 22 year old female with a medica history of type I diabetes presents with rectal pain for 3 days. Found to have perirectal abscess and DKA.     Type I diabetes with DKA:   Presents with blood glucose 579 with positive ketone and anion gap metabolic acidosis. Poorly controlled diabetes with HbA1C 14.2. PTA lantus 20 units qam and Novolog 1:10 carb count at mealtime. Patient reports that she sometimes forgot her insulin, especially the mealtime insulin. If she uses her Lantus 20 units in the morning and forgets her mealtime insulin, her blood sugar is normally 190-250. If she is compliant with all her insulin, blood sugar is 120-150. She stopped using her insulin pump since Nov 2021 after her glucose sensor was broken. She has a follow up appointment with her endocrinologist on 9/27/22.  - Insulin drip and IVF per DKA protocol  - Patient reports that she took her Lantus 20 units this morning. When DKA is corrected, when give additional 5 units of Lantus tonight.  Will increase her daily Lantus to 25 units QAM. Resume Novolog at 1:10 carb count TIDAC.  - Follow up with her endocrinologist on 9/27/22 as scheduled.      Hypokalemia, hypomagnesemia and hypophosphatemia: Will replace and recheck.     Pseudohyponatremia: sodium 127 on admission. It was 135 after corrected for blood sugar. Continue to monitor and correct as indicated.    Perirecal abscess: s/p I&D in ER  - Continue Zosyn         Diet:  Diabetes diet  DVT Prophylaxis: Low Risk/Ambulatory with no VTE prophylaxis indicated  Rojas Catheter: Not present  Central Lines: None  Cardiac Monitoring: None  Code Status:  Full code      Disposition Plan      Plan to discharge home in 1-2 days     The patient's care was discussed with the Bedside Nurse, Care Coordinator/ and Patient.    Abdirashid  MD Kavon  Hospitalist Service  [facility]  Securely message with the [employer] Web Console (learn more here)  Text page via [employer] Paging/Directory         ______________________________________________________________________    Chief Complaint   Rectal pain    History is obtained from the patient    History of Present Illness   Kimberly Patterson is a 22 year old female with a medica history of type I diabetes sent from urgent care for perirectal abscess. Patient reports that she developed rectal pain 3 days ago. She feels a soft painful lump inside her anus. She went to urgent care for evaluation today and was found to have a perirectal abscess. Patient was then sent to ER. In ER, incision and drainage of the abscess was performed. Patient felt immediate relief of her rectal pan. She was also found to have significantly elevated blood sugar of 579 with DKA. Patient reports no fever,cough, chest pain, SOB, nausea, vomiting, and abdominal pain. UA is negative.     Review of Systems    The 10 point Review of Systems is negative other than noted in the HPI or here.     Past Medical History    I have reviewed this patient's medical history and updated it with pertinent information if needed.   Past Medical History:   Diagnosis Date     Anxiety      Depressive disorder      Diabetes (H)      Pyelonephritis     June 2021, 2 ER visits       Past Surgical History   I have reviewed this patient's surgical history and updated it with pertinent information if needed.  Past Surgical History:   Procedure Laterality Date     CARDIAC SURGERY      2017- [place]. Congenital heart condition.       Social History   I have reviewed this patient's social history and updated it with pertinent information if needed.  Social History     Tobacco Use     Smoking status: Never Smoker     Smokeless tobacco: Never Used   Vaping Use     Vaping Use: Every day   Substance Use Topics     Alcohol use: Yes     Comment: occasional      "Drug use: Never       Family History   I have reviewed this patient's family history and updated it with pertinent information if needed.  Family History   Problem Relation Age of Onset     Depression Mother      Anxiety Disorder Mother      Alcoholism Mother      Multiple Sclerosis Mother      Depression Father      Anxiety Disorder Father      Alcoholism Father      No Known Problems Sister        Prior to Admission Medications   Prior to Admission Medications   Prescriptions Last Dose Informant Patient Reported? Taking?   Acetone, Urine, Test (KETONE TEST) STRP   Yes No   Si each   blood glucose (CONTOUR NEXT TEST) test strip   Yes No   Sig: Use to test blood sugar 4-6 times per day   blood glucose monitoring (ACCU-CHEK MULTICLIX) lancets   Yes No   etonogestrel (NEXPLANON) 68 MG IMPL   Yes Yes   Si each by Subdermal route once   glucagon 1 MG kit More than a month at Unknown time  Yes Yes   Sig: Use as directed in case of low blood sugar resulting in seizure or unconsciousness   insulin aspart (NOVOLOG VIAL) 100 UNITS/ML vial 2022 at Unknown time  Yes Yes   Si unit / 10g carbs   insulin glargine (LANTUS PEN) 100 UNIT/ML pen 2022 at am  Yes Yes   Sig: Inject 20 Units Subcutaneous every morning   insulin pen needle (32G X 4 MM) 32G X 4 MM miscellaneous   Yes No   Sig: Use as directed 4-6 needles per day   insulin syringe-needle U-100 (31G X 5/16\" 0.5 ML) 31G X 5/16\" 0.5 ML miscellaneous   Yes No   Sig: Use 4-6 syringes per day in case of insulin pump malfuntion   multivitamin, therapeutic (THERA-VIT) TABS tablet Past Week at Unknown time  Yes Yes   Sig: Take 1 tablet by mouth daily      Facility-Administered Medications: None     Allergies   Allergies   Allergen Reactions     Ketorolac Difficulty breathing       Physical Exam   Vital Signs: Temp: 97.7  F (36.5  C) Temp src: Temporal BP: 114/60 Pulse: 99   Resp: 16 SpO2: 99 %      Weight: 125 lbs 0 oz    General appearance: not in acute " distress  HEENT: PERRL, EOMI  Lungs: Clear breath sounds in bilateral lung fields  Cardiovascular: Regular rate and rhythm, normal S1-S2  Abdomen: Soft, non tender, no distension, normal bowel sound  Musculoskeletal: No joint swelling  Skin: No rash and no edema  Neurology: AAO ×3.  Cranial nerves II - XII normal.  Normal muscle strength in all four extremities.    Data   Data reviewed today: I reviewed all medications, new labs and imaging results over the last 24 hours.    Recent Labs   Lab 09/25/22  1459 09/25/22  1432 09/25/22  1343 09/25/22  1322   WBC  --  9.6  --   --    HGB  --  13.3  --   --    MCV  --  89  --   --    PLT  --  287  --   --    NA  --   --  127*  --    POTASSIUM  --   --  4.0  --    CHLORIDE  --   --  95*  --    CO2  --   --  11*  --    BUN  --   --  9.4  --    CR  --   --  0.49*  --    ANIONGAP  --   --  21*  --    VERN  --   --  8.8  --    *  --  579* >600*

## 2022-09-25 NOTE — ED TRIAGE NOTES
The pt presents as a referral from the clinic for evaluation of a rectal abscess near her rectum. She reports taking ibuprofen 2 hours ago and pain is well tolerated at this time.

## 2022-09-26 ENCOUNTER — ANESTHESIA EVENT (OUTPATIENT)
Dept: SURGERY | Facility: HOSPITAL | Age: 22
End: 2022-09-26
Payer: COMMERCIAL

## 2022-09-26 DIAGNOSIS — T88.4XXA HARD TO INTUBATE, INITIAL ENCOUNTER: Primary | ICD-10-CM

## 2022-09-26 LAB
ANION GAP SERPL CALCULATED.3IONS-SCNC: 10 MMOL/L (ref 7–15)
ANION GAP SERPL CALCULATED.3IONS-SCNC: 10 MMOL/L (ref 7–15)
ANION GAP SERPL CALCULATED.3IONS-SCNC: 9 MMOL/L (ref 7–15)
BUN SERPL-MCNC: 2.2 MG/DL (ref 6–20)
BUN SERPL-MCNC: 2.8 MG/DL (ref 6–20)
BUN SERPL-MCNC: 3 MG/DL (ref 6–20)
CALCIUM SERPL-MCNC: 7.8 MG/DL (ref 8.6–10)
CALCIUM SERPL-MCNC: 8.2 MG/DL (ref 8.6–10)
CALCIUM SERPL-MCNC: 8.4 MG/DL (ref 8.6–10)
CHLORIDE SERPL-SCNC: 104 MMOL/L (ref 98–107)
CHLORIDE SERPL-SCNC: 105 MMOL/L (ref 98–107)
CHLORIDE SERPL-SCNC: 105 MMOL/L (ref 98–107)
CREAT SERPL-MCNC: 0.42 MG/DL (ref 0.51–0.95)
CREAT SERPL-MCNC: 0.44 MG/DL (ref 0.51–0.95)
CREAT SERPL-MCNC: 0.46 MG/DL (ref 0.51–0.95)
DEPRECATED HCO3 PLAS-SCNC: 17 MMOL/L (ref 22–29)
DEPRECATED HCO3 PLAS-SCNC: 20 MMOL/L (ref 22–29)
DEPRECATED HCO3 PLAS-SCNC: 25 MMOL/L (ref 22–29)
GFR SERPL CREATININE-BSD FRML MDRD: >90 ML/MIN/1.73M2
GLUCOSE BLDC GLUCOMTR-MCNC: 127 MG/DL (ref 70–99)
GLUCOSE BLDC GLUCOMTR-MCNC: 158 MG/DL (ref 70–99)
GLUCOSE BLDC GLUCOMTR-MCNC: 160 MG/DL (ref 70–99)
GLUCOSE BLDC GLUCOMTR-MCNC: 182 MG/DL (ref 70–99)
GLUCOSE BLDC GLUCOMTR-MCNC: 227 MG/DL (ref 70–99)
GLUCOSE BLDC GLUCOMTR-MCNC: 317 MG/DL (ref 70–99)
GLUCOSE SERPL-MCNC: 125 MG/DL (ref 70–99)
GLUCOSE SERPL-MCNC: 153 MG/DL (ref 70–99)
GLUCOSE SERPL-MCNC: 191 MG/DL (ref 70–99)
HOLD SPECIMEN: NORMAL
HOLD SPECIMEN: NORMAL
MAGNESIUM SERPL-MCNC: 2 MG/DL (ref 1.7–2.3)
PHOSPHATE SERPL-MCNC: 2.3 MG/DL (ref 2.5–4.5)
POTASSIUM SERPL-SCNC: 3.4 MMOL/L (ref 3.4–5.3)
POTASSIUM SERPL-SCNC: 3.5 MMOL/L (ref 3.4–5.3)
POTASSIUM SERPL-SCNC: 3.7 MMOL/L (ref 3.4–5.3)
POTASSIUM SERPL-SCNC: 4 MMOL/L (ref 3.4–5.3)
SODIUM SERPL-SCNC: 132 MMOL/L (ref 136–145)
SODIUM SERPL-SCNC: 134 MMOL/L (ref 136–145)
SODIUM SERPL-SCNC: 139 MMOL/L (ref 136–145)

## 2022-09-26 PROCEDURE — 99232 SBSQ HOSP IP/OBS MODERATE 35: CPT | Performed by: STUDENT IN AN ORGANIZED HEALTH CARE EDUCATION/TRAINING PROGRAM

## 2022-09-26 PROCEDURE — 250N000012 HC RX MED GY IP 250 OP 636 PS 637: Performed by: INTERNAL MEDICINE

## 2022-09-26 PROCEDURE — 250N000013 HC RX MED GY IP 250 OP 250 PS 637: Performed by: INTERNAL MEDICINE

## 2022-09-26 PROCEDURE — 200N000001 HC R&B ICU

## 2022-09-26 PROCEDURE — 84132 ASSAY OF SERUM POTASSIUM: CPT | Performed by: INTERNAL MEDICINE

## 2022-09-26 PROCEDURE — 36415 COLL VENOUS BLD VENIPUNCTURE: CPT | Performed by: STUDENT IN AN ORGANIZED HEALTH CARE EDUCATION/TRAINING PROGRAM

## 2022-09-26 PROCEDURE — 83735 ASSAY OF MAGNESIUM: CPT | Performed by: INTERNAL MEDICINE

## 2022-09-26 PROCEDURE — 36415 COLL VENOUS BLD VENIPUNCTURE: CPT | Performed by: INTERNAL MEDICINE

## 2022-09-26 PROCEDURE — 250N000011 HC RX IP 250 OP 636: Performed by: INTERNAL MEDICINE

## 2022-09-26 PROCEDURE — 250N000009 HC RX 250: Performed by: COLON & RECTAL SURGERY

## 2022-09-26 PROCEDURE — 84100 ASSAY OF PHOSPHORUS: CPT | Performed by: INTERNAL MEDICINE

## 2022-09-26 PROCEDURE — 250N000013 HC RX MED GY IP 250 OP 250 PS 637: Performed by: STUDENT IN AN ORGANIZED HEALTH CARE EDUCATION/TRAINING PROGRAM

## 2022-09-26 PROCEDURE — 0D9P0ZZ DRAINAGE OF RECTUM, OPEN APPROACH: ICD-10-PCS | Performed by: COLON & RECTAL SURGERY

## 2022-09-26 PROCEDURE — 80048 BASIC METABOLIC PNL TOTAL CA: CPT | Performed by: STUDENT IN AN ORGANIZED HEALTH CARE EDUCATION/TRAINING PROGRAM

## 2022-09-26 PROCEDURE — 80048 BASIC METABOLIC PNL TOTAL CA: CPT | Performed by: INTERNAL MEDICINE

## 2022-09-26 RX ORDER — LIDOCAINE HYDROCHLORIDE AND EPINEPHRINE 10; 10 MG/ML; UG/ML
10 INJECTION, SOLUTION INFILTRATION; PERINEURAL ONCE
Status: COMPLETED | OUTPATIENT
Start: 2022-09-26 | End: 2022-09-26

## 2022-09-26 RX ORDER — POTASSIUM CHLORIDE 1500 MG/1
40 TABLET, EXTENDED RELEASE ORAL ONCE
Status: COMPLETED | OUTPATIENT
Start: 2022-09-26 | End: 2022-09-26

## 2022-09-26 RX ORDER — POTASSIUM CHLORIDE 1.5 G/1.58G
20 POWDER, FOR SOLUTION ORAL 2 TIMES DAILY
Status: DISCONTINUED | OUTPATIENT
Start: 2022-09-26 | End: 2022-09-28 | Stop reason: HOSPADM

## 2022-09-26 RX ADMIN — LIDOCAINE HYDROCHLORIDE AND EPINEPHRINE 10 ML: 10; 10 INJECTION, SOLUTION INFILTRATION; PERINEURAL at 15:02

## 2022-09-26 RX ADMIN — PIPERACILLIN AND TAZOBACTAM 3.38 G: 3; .375 INJECTION, POWDER, LYOPHILIZED, FOR SOLUTION INTRAVENOUS at 06:16

## 2022-09-26 RX ADMIN — POTASSIUM & SODIUM PHOSPHATES POWDER PACK 280-160-250 MG 1 PACKET: 280-160-250 PACK at 18:06

## 2022-09-26 RX ADMIN — OXYCODONE HYDROCHLORIDE 5 MG: 5 TABLET ORAL at 02:58

## 2022-09-26 RX ADMIN — PIPERACILLIN AND TAZOBACTAM 3.38 G: 3; .375 INJECTION, POWDER, LYOPHILIZED, FOR SOLUTION INTRAVENOUS at 22:11

## 2022-09-26 RX ADMIN — POTASSIUM & SODIUM PHOSPHATES POWDER PACK 280-160-250 MG 1 PACKET: 280-160-250 PACK at 10:29

## 2022-09-26 RX ADMIN — POTASSIUM CHLORIDE 20 MEQ: 1.5 POWDER, FOR SOLUTION ORAL at 10:31

## 2022-09-26 RX ADMIN — ACETAMINOPHEN 650 MG: 325 TABLET, FILM COATED ORAL at 20:28

## 2022-09-26 RX ADMIN — POTASSIUM CHLORIDE 20 MEQ: 1.5 POWDER, FOR SOLUTION ORAL at 22:11

## 2022-09-26 RX ADMIN — INSULIN ASPART 3 UNITS: 100 INJECTION, SOLUTION INTRAVENOUS; SUBCUTANEOUS at 22:12

## 2022-09-26 RX ADMIN — OXYCODONE HYDROCHLORIDE 5 MG: 5 TABLET ORAL at 12:08

## 2022-09-26 RX ADMIN — ACETAMINOPHEN 650 MG: 325 TABLET, FILM COATED ORAL at 02:58

## 2022-09-26 RX ADMIN — SILVER NITRATE: 38.21; 12.74 STICK TOPICAL at 15:03

## 2022-09-26 RX ADMIN — POTASSIUM CHLORIDE 40 MEQ: 1500 TABLET, EXTENDED RELEASE ORAL at 06:16

## 2022-09-26 RX ADMIN — POTASSIUM & SODIUM PHOSPHATES POWDER PACK 280-160-250 MG 2 PACKET: 280-160-250 PACK at 02:51

## 2022-09-26 RX ADMIN — POTASSIUM & SODIUM PHOSPHATES POWDER PACK 280-160-250 MG 1 PACKET: 280-160-250 PACK at 15:27

## 2022-09-26 RX ADMIN — PIPERACILLIN AND TAZOBACTAM 3.38 G: 3; .375 INJECTION, POWDER, LYOPHILIZED, FOR SOLUTION INTRAVENOUS at 12:28

## 2022-09-26 RX ADMIN — POTASSIUM & SODIUM PHOSPHATES POWDER PACK 280-160-250 MG 2 PACKET: 280-160-250 PACK at 06:16

## 2022-09-26 ASSESSMENT — ACTIVITIES OF DAILY LIVING (ADL)
ADLS_ACUITY_SCORE: 23
ADLS_ACUITY_SCORE: 25
ADLS_ACUITY_SCORE: 23
ADLS_ACUITY_SCORE: 25
ADLS_ACUITY_SCORE: 23
ADLS_ACUITY_SCORE: 25
ADLS_ACUITY_SCORE: 20

## 2022-09-26 NOTE — PLAN OF CARE
[facility] - ICU    RN Progress Note:            Pertinent Assessments:      Please refer to flowsheet rows for full assessment     VSS. C/o 7/10 rectal pain. Medicated with 5 mgs oxycodone and tylenol 650mgs. Pt able to sleep well and reports pain is a 1/10 aching discomfort now.          Mobility Level:     Lungs clear. VSS.Ambulated to BR with supervision and minimal of 1 assist.          Key Events - This Shift:   Voided 1000ccs in BR.             Problem: Plan of Care - These are the overarching goals to be used throughout the patient stay.    Goal: Absence of Hospital-Acquired Illness or Injury  Intervention: Identify and Manage Fall Risk  Recent Flowsheet Documentation  Taken 9/26/2022 0400 by Polly Byrd RN  Safety Promotion/Fall Prevention: activity supervised  Taken 9/26/2022 0000 by Polly Byrd RN  Safety Promotion/Fall Prevention: activity supervised  Intervention: Prevent Skin Injury  Recent Flowsheet Documentation  Taken 9/26/2022 0400 by Polly Byrd RN  Body Position: position changed independently  Taken 9/26/2022 0000 by Polly Byrd RN  Body Position: position changed independently  Intervention: Prevent and Manage VTE (Venous Thromboembolism) Risk  Recent Flowsheet Documentation  Taken 9/26/2022 0400 by Polly Byrd RN  Activity Management: activity adjusted per tolerance  Taken 9/26/2022 0000 by Polly Byrd RN  Activity Management: activity adjusted per tolerance  Goal: Optimal Comfort and Wellbeing  Intervention: Monitor Pain and Promote Comfort  Recent Flowsheet Documentation  Taken 9/26/2022 0258 by Polly Byrd RN  Pain Management Interventions:   medication (see MAR)   distraction   guided imagery     Problem: Pain Acute  Goal: Acceptable Pain Control and Functional Ability  Intervention: Develop Pain Management Plan  Recent Flowsheet Documentation  Taken 9/26/2022 0258 by Polly Byrd RN  Pain Management Interventions:   medication (see  MAR)   distraction   guided imagery  Intervention: Prevent or Manage Pain  Recent Flowsheet Documentation  Taken 9/26/2022 0400 by Polly Byrd, RN  Medication Review/Management: medications reviewed  Taken 9/26/2022 0000 by Polly Byrd, RN  Medication Review/Management: medications reviewed   Goal Outcome Evaluation:           Polly Byrd, RN

## 2022-09-26 NOTE — CONSULTS
[facility]  Colon and Rectal Surgery Consult Note  Name: Kimberly Patterson    MRN: [id number]  YOB: 2000    Age: 22 year old  Date of admission: 9/25/2022  Primary care provider: Emily Moyer     Requesting Physician:     Reason for consult:  Jordan Sellers MD           History of Present Illness:   Kimberly Patterson is a 22 year old female h/o [id number] presents with DKA and perirectal abscess. She has  Had perirectal pain since Thursday and this has been worsening. Had 1 BM since then which was not painful but did have pain whenever sitting down. No fevers or chills.  Has never had a perirectal abscess before. She came to the ED given her pain and the ED performed an incision and drainage. At this time she was found to be in DKA and was admitted to the ICU for treatment. The patient states her perirectal pain is slightly improved but still does hurt a lot. Last ate at 9:30am.              Past Medical History:     Past Medical History:   Diagnosis Date     Anxiety      Depressive disorder      Diabetes (H)      Pyelonephritis     June 2021, 2 ER visits             Past Surgical History:     Past Surgical History:   Procedure Laterality Date     CARDIAC SURGERY      2017- [place]. Congenital heart condition.               Social History:     Social History     Tobacco Use     Smoking status: Never Smoker     Smokeless tobacco: Never Used   Substance Use Topics     Alcohol use: Yes     Comment: occasional             Family History:     Family History   Problem Relation Age of Onset     Depression Mother      Anxiety Disorder Mother      Alcoholism Mother      Multiple Sclerosis Mother      Depression Father      Anxiety Disorder Father      Alcoholism Father      No Known Problems Sister              Allergies:     Allergies   Allergen Reactions     Ketorolac Difficulty breathing             Medications:       insulin aspart   Subcutaneous Daily with breakfast     insulin aspart    "Subcutaneous Daily with lunch     insulin aspart   Subcutaneous Daily with supper     insulin aspart  1-7 Units Subcutaneous TID AC     insulin aspart  1-5 Units Subcutaneous At Bedtime     insulin glargine  25 Units Subcutaneous QAM AC     piperacillin-tazobactam  3.375 g Intravenous Q8H     potassium & sodium phosphates  1 packet Oral or Feeding Tube Q4H     potassium chloride  20 mEq Oral BID             Review of Systems:   A comprehensive greater than 10 system review of systems was carried out.  Pertinent positives and negatives are noted above.  Otherwise negative for contributory info.            Physical Exam:   Blood pressure 108/72, pulse 96, temperature 98.4  F (36.9  C), temperature source Oral, resp. rate 18, height 1.651 m (5' 5\"), weight 60.3 kg (133 lb), SpO2 98 %, not currently breastfeeding.    Intake/Output Summary (Last 24 hours) at 9/26/2022 1237  Last data filed at 9/26/2022 0600  Gross per 24 hour   Intake 1015 ml   Output 1000 ml   Net 15 ml     Exam:  GENERAL: Awake alert and oriented  LUNGS: Non labored breathing  ABD: Soft, NTND, no rebound or guarding  EXTREMITIES: warm without edema  RECTAL: external exam with significant erythema, induration and drainage from a small right gluteal incision, with a small packing strip hanging out. Purulence noted on palpation of indurated area. Pt unable to tolerate MORGAN.         Data:   No results found for this or any previous visit (from the past 24 hour(s)).    No results for input(s): PH, PHARTERIAL, PO2, [id number], SAT, PCO2, HCO3, BASEEXCESS, REGAN, BEB in the last 168 hours.    Invalid input(s): [id number]  Recent Labs   Lab 09/25/22  1432   WBC 9.6   HGB 13.3   HCT 38.1   MCV 89        Recent Labs   Lab 09/26/22  1211 09/26/22  0913 09/26/22  0807 09/26/22  0303 09/26/22  0245 09/25/22  2342 09/25/22  2311   NA  --  134*  --   --  132*  --  130*   POTASSIUM  --  3.7  3.5  --   --  3.4  --  3.8   CHLORIDE  --  105  --   --  105  --  104 "   CO2  --  20*  --   --  17*  --  14*   ANIONGAP  --  9  --   --  10  --  12   * 191* 227*   < > 153*   < > 253*   BUN  --  2.2*  --   --  2.8*  --  3.2*   CR  --  0.44*  --   --  0.42*  --  0.48*   GFRESTIMATED  --  >90  --   --  >90  --  >90   VERN  --  8.2*  --   --  7.8*  --  7.4*    < > = values in this interval not displayed.         Assessment and Plan:   22F h/o T1DM presents with right perirectal abscess s/p incomplete I&D in ED as well as DKA. She is afebrile, vitals stable, WBC normal. On exam she has a large right perirectal abscess with surrounding erythema and induration and a small incision with ongoing purulent drainage.   - Continue zosyn  - Will plan for bedside incision and drainage today given patient last ate at 9:30am  - Will need premedication with dilaudid in addition to local analgesia given low pain tolerance and inability to tolerate exam    Addendum:   - Attempted bedside I&D and did open up the incision a bit but patient did not tolerate the procedure for long enough to obtain adequate drainage. Will plan for OR tomorrow for incision and drainage.  - NPO at midnight    Discussed with Dr. Lund who agrees with plan    Opal Morris MD  Fellow in Colon and Rectal Surgery  [facility]   Pager [phone]

## 2022-09-26 NOTE — CONSULTS
DIABETES CARE    Situation:  Consulted by Provider for Diabetes Education.  22 year old female with type 1 Diabetes. Patient was admitted for rectal pain for 3 days and found to have perirectal abscess and DKA. .     Background:  Related Co-morbidities include: Type 1 Diabetes  PCP: Emily Moyer MD  Social: Lives in apartment.     Diabetes History:   Patient has had diabetes since about the age of 9.      Meds for BG Management PTA:  Novolog vial 1:10 units for meals  Lantus Pen 20 units at bedtime.    Glucagon 1mg kit      Current Inpatient Meds for BG Management:  Novolog 1:10 meals  Novolog medium correction scale 1 drops 50 over 140 mg/dl  Lantus 25 units every morning        Labs:  Hemoglobin A1C: 14.2%  Hgb: 13.3 g/dL  SCr: 0.44 mg/dL   GFR >90    Blood Glucose POC:    Latest Reference Range & Units 09/25/22 18:24 09/25/22 19:43 09/25/22 20:41 09/25/22 23:42 09/26/22 03:03 09/26/22 06:20 09/26/22 08:07   GLUCOSE BY METER POCT 70 - 99 mg/dL 322 (H) 176 (H) 124 (H) 248 (H) 160 (H) 182 (H) 227 (H)     Diet Order: 60 gram CHO  Intake:Good  Weight: 60.3 kg    BMI: 22.13kg/m^2    DM EDUCATION/COUNSELING:  Barriers to Learning and/or DM Self-Management: None known  Previous DM Education: Yes, meets with endocrinology and establishing care with primary.   Current education and/or visit with patient and/or caregiver:  Educated/reviewed diabetes basics: pathophysiology, hyperglycemia, long term complications, treatment. Discussed DKA and how to avoid, given handout.   Educated/reviewed hypoglycemia: sx, causes, treatment.  Discussed getting back on a pump. She has new insurance and hopes not to have to work with [employer] for getting pump and supplies.  She has had issues communicating with them.    She likes her Medtronic 770 G pump with sensor but only had it for a few months and then lost the sensor.    Due to her A1c and being in DKA and the fact that her A1c was much better on the pump, she agrees it is best to  "get back on the pump.  She has an appointment scheduled with endocrinology tomorrow.    Written handouts given on all education provided.       Recommendations:  1. Get back on  Pump and sensor for better control.    2. See endo tomorrow to get pump ordered etc or back on.   3. Follow up with provider as well.     Refer to \"Guidelines for Insulin Initiation and Care in Hospitalized Adults\"  link in Diabetes Management Order set for dosing guidelines.  Hospital goals for blood glucose levels are < 180 mg/dL for improved health outcomes.        DISCHARGE NEEDS:  RX needed at DC (preferred by patient's insurance):  Patient not covered for [employer] at this time  Please order:  Accu chek Guide me meter and:  1. Accuchek Guide strips, box of 100   2. Softclix lancets, box of 100  Insulin requiring  E11.65  To test BG 3-4 x per day.  Also does not have ketone strips that are not outdated. Please order a box of ketone strips to be able to test.  Pump uses Accu chek guide link meter but she will have to wait for the pump to be restarted and may need to wait for a new sensor.        Thank you,   Pratima Pavon RN, [facility]  VM: [phone]  Pager: [phone]       "

## 2022-09-26 NOTE — PROGRESS NOTES
Progress Note    Date of admission: 09/25/2022    Assessment/Plan  Kimberly Patterson is a 22 year old female with a medica history of type I diabetes presents with rectal pain for 3 days. Found to have perirectal abscess and DKA.      Type I diabetes with DKA  - S/p DKA treatment.  -Continue lantus 25 units qam and Novolog 1:10 carb count at mealtime.   - If she is compliant with all her insulin, blood sugar is 120-150. She stopped using her insulin pump since Nov 2021 after her glucose sensor was broken. She has a follow up appointment with her endocrinologist on 9/27/22.  - Follow up with her endocrinologist on 9/27/22 as scheduled.       Hypokalemia, hypomagnesemia and hypophosphatemia:   -Replace per protocol.      Perirecal abscess:   -- s/p I&D in ER  - Continue Zosyn  -Colorectal surgery consulted.  They attempted further I&D at the bedside nevertheless patient did not tolerate the procedure.  -Planning to do I&D in the OR tomorrow.  N.p.o. after midnight.    DVT PPX : SCD    Barriers to discharge: Perianal abscess    Anticipated Discharge date  : 1-2 days     Subjective  No distress noted.  Management plan discussed with the patient and her partner at the bedside.    Objective  Vital signs in last 24 hours  Temp:  [98  F (36.7  C)-100.1  F (37.8  C)] 98.2  F (36.8  C)  Pulse:  [] 113  Resp:  [18] 18  BP: ()/(51-77) 120/73  SpO2:  [98 %-100 %] 98 %    Input and Output in 24 hrs     Intake/Output Summary (Last 24 hours) at 9/26/2022 1659  Last data filed at 9/26/2022 1600  Gross per 24 hour   Intake 1715 ml   Output 2000 ml   Net -285 ml       Physical Exam:  GEN: Alert and oriented. Not in acute distress  HEENT: Mucous membrane- moist and pink  CHEST: Clear to auscultation bilaterally  HEART: S1S2 regular. No murmurs, rubs or gallops  ABDOMEN: Soft. Non-tender, Bowel sounds- active, severe tenderness on the left side of gluteal fold  Extremities: No pedal oedema  CNS: No focal neurological deficit. No  involuntary movements  SKIN: No skin rash, no cyanosis or clubbing      Pertinent Labs   Lab Results: Personally Reviewed    Recent Results (from the past 24 hour(s))   Glucose by meter    Collection Time: 09/25/22  5:17 PM   Result Value Ref Range    GLUCOSE BY METER POCT 195 (H) 70 - 99 mg/dL   Glucose by meter    Collection Time: 09/25/22  6:24 PM   Result Value Ref Range    GLUCOSE BY METER POCT 322 (H) 70 - 99 mg/dL   Basic metabolic panel    Collection Time: 09/25/22  7:07 PM   Result Value Ref Range    Sodium 133 (L) 136 - 145 mmol/L    Potassium 2.8 (L) 3.4 - 5.3 mmol/L    Chloride 106 98 - 107 mmol/L    Carbon Dioxide (CO2) 14 (L) 22 - 29 mmol/L    Anion Gap 13 7 - 15 mmol/L    Urea Nitrogen 4.6 (L) 6.0 - 20.0 mg/dL    Creatinine 0.41 (L) 0.51 - 0.95 mg/dL    Calcium 7.8 (L) 8.6 - 10.0 mg/dL    Glucose 214 (H) 70 - 99 mg/dL    GFR Estimate >90 >60 mL/min/1.73m2   Glucose by meter    Collection Time: 09/25/22  7:43 PM   Result Value Ref Range    GLUCOSE BY METER POCT 176 (H) 70 - 99 mg/dL   Glucose by meter    Collection Time: 09/25/22  8:41 PM   Result Value Ref Range    GLUCOSE BY METER POCT 124 (H) 70 - 99 mg/dL   Basic metabolic panel    Collection Time: 09/25/22 11:11 PM   Result Value Ref Range    Sodium 130 (L) 136 - 145 mmol/L    Potassium 3.8 3.4 - 5.3 mmol/L    Chloride 104 98 - 107 mmol/L    Carbon Dioxide (CO2) 14 (L) 22 - 29 mmol/L    Anion Gap 12 7 - 15 mmol/L    Urea Nitrogen 3.2 (L) 6.0 - 20.0 mg/dL    Creatinine 0.48 (L) 0.51 - 0.95 mg/dL    Calcium 7.4 (L) 8.6 - 10.0 mg/dL    Glucose 253 (H) 70 - 99 mg/dL    GFR Estimate >90 >60 mL/min/1.73m2   Glucose by meter    Collection Time: 09/25/22 11:42 PM   Result Value Ref Range    GLUCOSE BY METER POCT 248 (H) 70 - 99 mg/dL   Basic metabolic panel    Collection Time: 09/26/22  2:45 AM   Result Value Ref Range    Sodium 132 (L) 136 - 145 mmol/L    Potassium 3.4 3.4 - 5.3 mmol/L    Chloride 105 98 - 107 mmol/L    Carbon Dioxide (CO2) 17 (L) 22 -  29 mmol/L    Anion Gap 10 7 - 15 mmol/L    Urea Nitrogen 2.8 (L) 6.0 - 20.0 mg/dL    Creatinine 0.42 (L) 0.51 - 0.95 mg/dL    Calcium 7.8 (L) 8.6 - 10.0 mg/dL    Glucose 153 (H) 70 - 99 mg/dL    GFR Estimate >90 >60 mL/min/1.73m2   Magnesium    Collection Time: 09/26/22  2:45 AM   Result Value Ref Range    Magnesium 2.0 1.7 - 2.3 mg/dL   Extra Purple Top Tube    Collection Time: 09/26/22  2:45 AM   Result Value Ref Range    Hold Specimen [facility]    Glucose by meter    Collection Time: 09/26/22  3:03 AM   Result Value Ref Range    GLUCOSE BY METER POCT 160 (H) 70 - 99 mg/dL   Glucose by meter    Collection Time: 09/26/22  6:20 AM   Result Value Ref Range    GLUCOSE BY METER POCT 182 (H) 70 - 99 mg/dL   Glucose by meter    Collection Time: 09/26/22  8:07 AM   Result Value Ref Range    GLUCOSE BY METER POCT 227 (H) 70 - 99 mg/dL   Extra Purple Top Tube    Collection Time: 09/26/22  9:06 AM   Result Value Ref Range    Hold Specimen [facility]    Basic metabolic panel    Collection Time: 09/26/22  9:13 AM   Result Value Ref Range    Sodium 134 (L) 136 - 145 mmol/L    Potassium 3.7 3.4 - 5.3 mmol/L    Chloride 105 98 - 107 mmol/L    Carbon Dioxide (CO2) 20 (L) 22 - 29 mmol/L    Anion Gap 9 7 - 15 mmol/L    Urea Nitrogen 2.2 (L) 6.0 - 20.0 mg/dL    Creatinine 0.44 (L) 0.51 - 0.95 mg/dL    Calcium 8.2 (L) 8.6 - 10.0 mg/dL    Glucose 191 (H) 70 - 99 mg/dL    GFR Estimate >90 >60 mL/min/1.73m2   Phosphorus    Collection Time: 09/26/22  9:13 AM   Result Value Ref Range    Phosphorus 2.3 (L) 2.5 - 4.5 mg/dL   Potassium    Collection Time: 09/26/22  9:13 AM   Result Value Ref Range    Potassium 3.5 3.4 - 5.3 mmol/L   Glucose by meter    Collection Time: 09/26/22 12:11 PM   Result Value Ref Range    GLUCOSE BY METER POCT 158 (H) 70 - 99 mg/dL       Pertinent Radiology   Radiology Results reviewed      EKG Results reviewed       Advanced Care Planning      Jordan Sellers MD   [facility]

## 2022-09-26 NOTE — DISCHARGE INSTRUCTIONS
DIABETES REMINDERS:  1) Check your blood sugar before all meals, at bedtime and if feelings of lows or highs.   Always bring your blood sugar log and meter to your diabetes-related appointments.  2) Your blood sugar goals:  80-130mg/dL before eating  and  mg/dL 2 hours after eating (or per your doctor).  3) Always be prepared to treat a low blood sugar should it happen. Keep a sugar-containing beverage or food nearby.  4) When to call your clinic:   Blood sugar over 400 mg/dL.   If you have 2 to 3 low blood sugars (under 70mg/dL) in a row,   Low reading the same time of day several days in a row,  Blood sugars elevated and you can not get them down with your usual diabetes regimen,  You are ill and can't keep blood sugars controlled.   5) When to call 911:  If your blood glucose does not get better with treatment, or if you/someone else is unable to give you treatment.  6) Talk to your primary care doctor about an appointment with a Certified Diabetes Educator to assist you with your BG management.  7) Follow insulin regimen on discharge orders until able to see provider where doses may be adjusted based on blood sugar patterns.  8) Restart insulin pump and sensor for better control at you endocrinology appointment on Tuesday September 27.

## 2022-09-27 ENCOUNTER — ANESTHESIA (OUTPATIENT)
Dept: SURGERY | Facility: HOSPITAL | Age: 22
End: 2022-09-27
Payer: COMMERCIAL

## 2022-09-27 ENCOUNTER — PRE VISIT (OUTPATIENT)
Dept: ENDOCRINOLOGY | Facility: CLINIC | Age: 22
End: 2022-09-27

## 2022-09-27 PROBLEM — T88.4XXA HARD TO INTUBATE: Status: ACTIVE | Noted: 2022-09-27

## 2022-09-27 LAB
ANION GAP SERPL CALCULATED.3IONS-SCNC: 2 MMOL/L (ref 7–15)
BACTERIA UR CULT: ABNORMAL
BACTERIA UR CULT: ABNORMAL
BUN SERPL-MCNC: 3.8 MG/DL (ref 6–20)
CALCIUM SERPL-MCNC: 8.6 MG/DL (ref 8.6–10)
CHLORIDE SERPL-SCNC: 103 MMOL/L (ref 98–107)
CREAT SERPL-MCNC: 0.42 MG/DL (ref 0.51–0.95)
DEPRECATED HCO3 PLAS-SCNC: 34 MMOL/L (ref 22–29)
ERYTHROCYTE [DISTWIDTH] IN BLOOD BY AUTOMATED COUNT: 13.1 % (ref 10–15)
GFR SERPL CREATININE-BSD FRML MDRD: >90 ML/MIN/1.73M2
GLUCOSE BLDC GLUCOMTR-MCNC: 148 MG/DL (ref 70–99)
GLUCOSE BLDC GLUCOMTR-MCNC: 163 MG/DL (ref 70–99)
GLUCOSE BLDC GLUCOMTR-MCNC: 172 MG/DL (ref 70–99)
GLUCOSE BLDC GLUCOMTR-MCNC: 256 MG/DL (ref 70–99)
GLUCOSE BLDC GLUCOMTR-MCNC: 375 MG/DL (ref 70–99)
GLUCOSE SERPL-MCNC: 270 MG/DL (ref 70–99)
HCT VFR BLD AUTO: 33.7 % (ref 35–47)
HGB BLD-MCNC: 11.7 G/DL (ref 11.7–15.7)
MAGNESIUM SERPL-MCNC: 1.7 MG/DL (ref 1.7–2.3)
MCH RBC QN AUTO: 30.8 PG (ref 26.5–33)
MCHC RBC AUTO-ENTMCNC: 34.7 G/DL (ref 31.5–36.5)
MCV RBC AUTO: 89 FL (ref 78–100)
PHOSPHATE SERPL-MCNC: 3.4 MG/DL (ref 2.5–4.5)
PLATELET # BLD AUTO: 234 10E3/UL (ref 150–450)
POTASSIUM SERPL-SCNC: 3.6 MMOL/L (ref 3.4–5.3)
RBC # BLD AUTO: 3.8 10E6/UL (ref 3.8–5.2)
SODIUM SERPL-SCNC: 139 MMOL/L (ref 136–145)
WBC # BLD AUTO: 6.2 10E3/UL (ref 4–11)

## 2022-09-27 PROCEDURE — 250N000025 HC SEVOFLURANE, PER MIN: Performed by: COLON & RECTAL SURGERY

## 2022-09-27 PROCEDURE — 250N000011 HC RX IP 250 OP 636: Performed by: COLON & RECTAL SURGERY

## 2022-09-27 PROCEDURE — 999N000141 HC STATISTIC PRE-PROCEDURE NURSING ASSESSMENT: Performed by: COLON & RECTAL SURGERY

## 2022-09-27 PROCEDURE — 258N000003 HC RX IP 258 OP 636: Performed by: ANESTHESIOLOGY

## 2022-09-27 PROCEDURE — 360N000075 HC SURGERY LEVEL 2, PER MIN: Performed by: COLON & RECTAL SURGERY

## 2022-09-27 PROCEDURE — 250N000013 HC RX MED GY IP 250 OP 250 PS 637: Performed by: INTERNAL MEDICINE

## 2022-09-27 PROCEDURE — 200N000001 HC R&B ICU

## 2022-09-27 PROCEDURE — 250N000009 HC RX 250: Performed by: ANESTHESIOLOGY

## 2022-09-27 PROCEDURE — C9290 INJ, BUPIVACAINE LIPOSOME: HCPCS | Performed by: COLON & RECTAL SURGERY

## 2022-09-27 PROCEDURE — 0DJD8ZZ INSPECTION OF LOWER INTESTINAL TRACT, VIA NATURAL OR ARTIFICIAL OPENING ENDOSCOPIC: ICD-10-PCS | Performed by: COLON & RECTAL SURGERY

## 2022-09-27 PROCEDURE — 0D9P0ZZ DRAINAGE OF RECTUM, OPEN APPROACH: ICD-10-PCS | Performed by: COLON & RECTAL SURGERY

## 2022-09-27 PROCEDURE — 250N000011 HC RX IP 250 OP 636: Performed by: ANESTHESIOLOGY

## 2022-09-27 PROCEDURE — 36415 COLL VENOUS BLD VENIPUNCTURE: CPT | Performed by: STUDENT IN AN ORGANIZED HEALTH CARE EDUCATION/TRAINING PROGRAM

## 2022-09-27 PROCEDURE — 710N000009 HC RECOVERY PHASE 1, LEVEL 1, PER MIN: Performed by: COLON & RECTAL SURGERY

## 2022-09-27 PROCEDURE — 370N000017 HC ANESTHESIA TECHNICAL FEE, PER MIN: Performed by: COLON & RECTAL SURGERY

## 2022-09-27 PROCEDURE — 80048 BASIC METABOLIC PNL TOTAL CA: CPT | Performed by: STUDENT IN AN ORGANIZED HEALTH CARE EDUCATION/TRAINING PROGRAM

## 2022-09-27 PROCEDURE — 250N000011 HC RX IP 250 OP 636: Performed by: INTERNAL MEDICINE

## 2022-09-27 PROCEDURE — 250N000013 HC RX MED GY IP 250 OP 250 PS 637: Performed by: STUDENT IN AN ORGANIZED HEALTH CARE EDUCATION/TRAINING PROGRAM

## 2022-09-27 PROCEDURE — 85027 COMPLETE CBC AUTOMATED: CPT | Performed by: STUDENT IN AN ORGANIZED HEALTH CARE EDUCATION/TRAINING PROGRAM

## 2022-09-27 PROCEDURE — 99232 SBSQ HOSP IP/OBS MODERATE 35: CPT | Performed by: STUDENT IN AN ORGANIZED HEALTH CARE EDUCATION/TRAINING PROGRAM

## 2022-09-27 PROCEDURE — 272N000001 HC OR GENERAL SUPPLY STERILE: Performed by: COLON & RECTAL SURGERY

## 2022-09-27 PROCEDURE — 83735 ASSAY OF MAGNESIUM: CPT | Performed by: INTERNAL MEDICINE

## 2022-09-27 PROCEDURE — 84100 ASSAY OF PHOSPHORUS: CPT | Performed by: STUDENT IN AN ORGANIZED HEALTH CARE EDUCATION/TRAINING PROGRAM

## 2022-09-27 RX ORDER — ONDANSETRON 2 MG/ML
INJECTION INTRAMUSCULAR; INTRAVENOUS PRN
Status: DISCONTINUED | OUTPATIENT
Start: 2022-09-27 | End: 2022-09-27

## 2022-09-27 RX ORDER — ONDANSETRON 4 MG/1
4 TABLET, ORALLY DISINTEGRATING ORAL EVERY 30 MIN PRN
Status: DISCONTINUED | OUTPATIENT
Start: 2022-09-27 | End: 2022-09-27 | Stop reason: HOSPADM

## 2022-09-27 RX ORDER — FENTANYL CITRATE 50 UG/ML
INJECTION, SOLUTION INTRAMUSCULAR; INTRAVENOUS PRN
Status: DISCONTINUED | OUTPATIENT
Start: 2022-09-27 | End: 2022-09-27

## 2022-09-27 RX ORDER — LIDOCAINE HYDROCHLORIDE 10 MG/ML
INJECTION, SOLUTION INFILTRATION; PERINEURAL PRN
Status: DISCONTINUED | OUTPATIENT
Start: 2022-09-27 | End: 2022-09-27

## 2022-09-27 RX ORDER — LIDOCAINE 40 MG/G
CREAM TOPICAL
Status: DISCONTINUED | OUTPATIENT
Start: 2022-09-27 | End: 2022-09-27 | Stop reason: HOSPADM

## 2022-09-27 RX ORDER — PROPOFOL 10 MG/ML
INJECTION, EMULSION INTRAVENOUS PRN
Status: DISCONTINUED | OUTPATIENT
Start: 2022-09-27 | End: 2022-09-27

## 2022-09-27 RX ORDER — SODIUM CHLORIDE, SODIUM LACTATE, POTASSIUM CHLORIDE, CALCIUM CHLORIDE 600; 310; 30; 20 MG/100ML; MG/100ML; MG/100ML; MG/100ML
INJECTION, SOLUTION INTRAVENOUS CONTINUOUS
Status: DISCONTINUED | OUTPATIENT
Start: 2022-09-27 | End: 2022-09-27 | Stop reason: HOSPADM

## 2022-09-27 RX ORDER — ONDANSETRON 2 MG/ML
4 INJECTION INTRAMUSCULAR; INTRAVENOUS EVERY 30 MIN PRN
Status: DISCONTINUED | OUTPATIENT
Start: 2022-09-27 | End: 2022-09-27 | Stop reason: HOSPADM

## 2022-09-27 RX ORDER — DEXAMETHASONE SODIUM PHOSPHATE 10 MG/ML
INJECTION, SOLUTION INTRAMUSCULAR; INTRAVENOUS PRN
Status: DISCONTINUED | OUTPATIENT
Start: 2022-09-27 | End: 2022-09-27

## 2022-09-27 RX ORDER — BUPIVACAINE HYDROCHLORIDE 2.5 MG/ML
INJECTION, SOLUTION INFILTRATION; PERINEURAL PRN
Status: DISCONTINUED | OUTPATIENT
Start: 2022-09-27 | End: 2022-09-27 | Stop reason: HOSPADM

## 2022-09-27 RX ORDER — FENTANYL CITRATE 50 UG/ML
25 INJECTION, SOLUTION INTRAMUSCULAR; INTRAVENOUS EVERY 5 MIN PRN
Status: DISCONTINUED | OUTPATIENT
Start: 2022-09-27 | End: 2022-09-27 | Stop reason: HOSPADM

## 2022-09-27 RX ORDER — SODIUM CHLORIDE, SODIUM LACTATE, POTASSIUM CHLORIDE, CALCIUM CHLORIDE 600; 310; 30; 20 MG/100ML; MG/100ML; MG/100ML; MG/100ML
INJECTION, SOLUTION INTRAVENOUS CONTINUOUS PRN
Status: DISCONTINUED | OUTPATIENT
Start: 2022-09-27 | End: 2022-09-27

## 2022-09-27 RX ORDER — OXYCODONE HYDROCHLORIDE 5 MG/1
5 TABLET ORAL EVERY 4 HOURS PRN
Status: DISCONTINUED | OUTPATIENT
Start: 2022-09-27 | End: 2022-09-27 | Stop reason: HOSPADM

## 2022-09-27 RX ADMIN — FENTANYL CITRATE 100 MCG: 50 INJECTION, SOLUTION INTRAMUSCULAR; INTRAVENOUS at 15:47

## 2022-09-27 RX ADMIN — POTASSIUM CHLORIDE 20 MEQ: 1.5 POWDER, FOR SOLUTION ORAL at 08:11

## 2022-09-27 RX ADMIN — PIPERACILLIN AND TAZOBACTAM 3.38 G: 3; .375 INJECTION, POWDER, LYOPHILIZED, FOR SOLUTION INTRAVENOUS at 12:31

## 2022-09-27 RX ADMIN — ONDANSETRON 4 MG: 2 INJECTION INTRAMUSCULAR; INTRAVENOUS at 15:05

## 2022-09-27 RX ADMIN — DEXAMETHASONE SODIUM PHOSPHATE 10 MG: 10 INJECTION, SOLUTION INTRAMUSCULAR; INTRAVENOUS at 15:05

## 2022-09-27 RX ADMIN — PIPERACILLIN AND TAZOBACTAM 3.38 G: 3; .375 INJECTION, POWDER, LYOPHILIZED, FOR SOLUTION INTRAVENOUS at 04:39

## 2022-09-27 RX ADMIN — ACETAMINOPHEN 650 MG: 325 TABLET, FILM COATED ORAL at 08:16

## 2022-09-27 RX ADMIN — INSULIN ASPART 4 UNITS: 100 INJECTION, SOLUTION INTRAVENOUS; SUBCUTANEOUS at 21:06

## 2022-09-27 RX ADMIN — SODIUM CHLORIDE, POTASSIUM CHLORIDE, SODIUM LACTATE AND CALCIUM CHLORIDE: 600; 310; 30; 20 INJECTION, SOLUTION INTRAVENOUS at 15:00

## 2022-09-27 RX ADMIN — SUGAMMADEX 240 MG: 100 INJECTION, SOLUTION INTRAVENOUS at 15:41

## 2022-09-27 RX ADMIN — FENTANYL CITRATE 100 MCG: 50 INJECTION, SOLUTION INTRAMUSCULAR; INTRAVENOUS at 15:05

## 2022-09-27 RX ADMIN — LIDOCAINE HYDROCHLORIDE 30 MG: 10 INJECTION, SOLUTION INFILTRATION; PERINEURAL at 15:05

## 2022-09-27 RX ADMIN — POTASSIUM CHLORIDE 20 MEQ: 1.5 POWDER, FOR SOLUTION ORAL at 21:06

## 2022-09-27 RX ADMIN — ROCURONIUM BROMIDE 50 MG: 50 INJECTION, SOLUTION INTRAVENOUS at 15:05

## 2022-09-27 RX ADMIN — PROPOFOL 200 MG: 10 INJECTION, EMULSION INTRAVENOUS at 15:05

## 2022-09-27 RX ADMIN — PROPOFOL 30 MG: 10 INJECTION, EMULSION INTRAVENOUS at 15:49

## 2022-09-27 ASSESSMENT — COPD QUESTIONNAIRES: COPD: 0

## 2022-09-27 ASSESSMENT — ENCOUNTER SYMPTOMS: SEIZURES: 0

## 2022-09-27 ASSESSMENT — LIFESTYLE VARIABLES: TOBACCO_USE: 0

## 2022-09-27 ASSESSMENT — ACTIVITIES OF DAILY LIVING (ADL)
ADLS_ACUITY_SCORE: 26
ADLS_ACUITY_SCORE: 23
ADLS_ACUITY_SCORE: 26
ADLS_ACUITY_SCORE: 26
ADLS_ACUITY_SCORE: 23

## 2022-09-27 NOTE — PROGRESS NOTES
Colon and Rectal Surgery  Daily Progress Note    Subjective  No acute changes overnight. Reports tailbone pressure has improved from attempted procedure yesterday, but still ongoing pain. Minimal amount of drainage from wound. Remains NPO for procedure today.     Objective  Intake/Output last 24 hrs:    Intake/Output Summary (Last 24 hours) at 9/27/2022 0857  Last data filed at 9/27/2022 0200  Gross per 24 hour   Intake 1302 ml   Output 2700 ml   Net -1398 ml     Temp:  [98  F (36.7  C)-99.9  F (37.7  C)] 98  F (36.7  C)  Pulse:  [] 75  BP: ()/(53-79) 99/57  SpO2:  [97 %-100 %] 99 %    Physical Exam:  General: awake, alert, laying in bed, in no acute distress  Head: normocephalic, atraumatic  Respiratory: non-labored breathing  Rectal: Deferred rectal exam.   Skin: No rashes or lesions  Musculoskeletal: moves all four extremities equally  Psychological: alert and oriented, answers questions appropriately    Pertinent Labs  Lab Results: personally reviewed.  Lab Results   Component Value Date     09/27/2022     09/26/2022     09/26/2022     05/13/2021    CO2 34 09/27/2022    CO2 25 09/26/2022    CO2 20 09/26/2022    CO2 27 05/13/2021    BUN 3.8 09/27/2022    BUN 3.0 09/26/2022    BUN 2.2 09/26/2022    BUN 15 05/13/2021     Lab Results   Component Value Date    WBC 6.2 09/27/2022    WBC 9.6 09/25/2022    HGB 11.7 09/27/2022    HGB 13.3 09/25/2022    HCT 33.7 09/27/2022    HCT 38.1 09/25/2022    MCV 89 09/27/2022    MCV 89 09/25/2022     09/27/2022     09/25/2022       Assessment/Plan: 22 female history of T1DM who presents with right perirectal abscess s/p incomplete I&D in ED and as well as DKA. Attempted I&D at bedside yesterday but unsuccessful. Plan for I&D in OR today.     WBC 6.2  Hgb 11.7  Cr 0.42  AVSS    -Remain NPO  -Plan for OR later today for I&D of perirectal abscess  -Continue IV abx    Will discuss with Dr. Ashely Aguero PA-C  [facility]  [phone]..............................main

## 2022-09-27 NOTE — PLAN OF CARE
Problem: Pain Acute  Goal: Acceptable Pain Control and Functional Ability  Intervention: Prevent or Manage Pain     Problem: Diabetic Ketoacidosis  Goal: Fluid and Electrolyte Balance with Absence of Ketosis  Outcome: Ongoing, Progressing     Problem: Pain Acute  Goal: Acceptable Pain Control and Functional Ability  Outcome: Ongoing, Progressing       Goal Outcome Evaluation:    [facility] - ICU    RN Progress Note:            Pertinent Assessments:      Please refer to flowsheet rows for full assessment     Patient's pain well managed with PRN Tylenol, see MAR.  Patient sleeping between cares.           Mobility Level:     5         Key Events - This Shift:     Patient receiving IV antibiotics.  Minimal drainage from wound.  Reinforced dressing per order.               Plan:     Will continue to monitor

## 2022-09-27 NOTE — ANESTHESIA POSTPROCEDURE EVALUATION
Patient: Kimberly Patterson    Procedure: Procedure(s):  EXAM UNDER ANESTHESIA, INCISION AND DRAINAGE JAIDEN-RECTAL ABSCESS, DRAIN PLACEMENT       Anesthesia Type:  General    Note:  Disposition: Outpatient   Postop Pain Control: Uneventful            Sign Out: Well controlled pain   PONV: No   Neuro/Psych: Uneventful            Sign Out: Acceptable/Baseline neuro status   Airway/Respiratory: Uneventful            Sign Out: Acceptable/Baseline resp. status   CV/Hemodynamics: Uneventful            Sign Out: Acceptable CV status; No obvious hypovolemia; No obvious fluid overload   Other NRE: NONE   DID A NON-ROUTINE EVENT OCCUR? No           Last vitals:  Vitals Value Taken Time   /79 09/27/22 1645   Temp 36.4  C (97.5  F) 09/27/22 1630   Pulse 77 09/27/22 1645   Resp 16 09/27/22 1645   SpO2 99 % 09/27/22 1650       Electronically Signed By: Azam Lane MD  September 27, 2022  5:13 PM

## 2022-09-27 NOTE — ANESTHESIA PREPROCEDURE EVALUATION
Anesthesia Pre-Procedure Evaluation    Patient: Kimberly Patterson   MRN: [id number] : 2000        Procedure : Procedure(s):  INCISION AND DRAINAGE, ABSCESS, RECTUM          Past Medical History:   Diagnosis Date     Anxiety      Depressive disorder      Diabetes (H)      Pyelonephritis     2021, 2 ER visits      Past Surgical History:   Procedure Laterality Date     CARDIAC SURGERY      2017- [place]. Congenital heart condition.      Allergies   Allergen Reactions     Ketorolac Difficulty breathing      Social History     Tobacco Use     Smoking status: Never Smoker     Smokeless tobacco: Never Used   Substance Use Topics     Alcohol use: Yes     Comment: occasional      Wt Readings from Last 1 Encounters:   22 60.3 kg (133 lb)        Anesthesia Evaluation   Pt has had prior anesthetic. Type: General.    No history of anesthetic complications       ROS/MED HX  ENT/Pulmonary: Comment: Vapes but does not smoke   (-) tobacco use, asthma, COPD, sleep apnea and HAYES risk factors   Neurologic: Comment: Probable concussion w/o residual    (+) no peripheral neuropathy  (-) no seizures, no CVA, no TIA, no Parkinson's disease, no Multiple Sclerosis, no Spinal cord injury, no Developmental delay, Delerium and Dementia   Cardiovascular: Comment: VSD s/p cardiac surgery at [place] in 2017 echo: Conclusions   There is a moderate midmuscular VSD with bidirectional shunting. There is a peak velocity of 475 cm/s and a peak   pressure gardient of 90 mmHg. Mildy dilated left atrium by visual assessment. Normal LV size, wall thickness, and   function; estimated EF = 66%. Normal RV size, wall thickness, and function; RVSP = 27 mmHg. Normal cardiac   valves. Trival tricuspid and pulmonic regurgiation. No obvious intra-atrial shunts. Normal coronary artery origins.       (+) -----congenital heart disease s/p VSD closure .  (-) hypertension, CAD, CHF, TRAVIS, pacemaker, valvular problems/murmurs, dyslipidemia, stent,  pacemaker and ICD   METS/Exercise Tolerance: >4 METS    Hematologic:       Musculoskeletal:  - neg musculoskeletal ROS     GI/Hepatic: Comment: Perirectal abscess   (-) GERD, hiatal hernia and liver disease   Renal/Genitourinary:    (-) renal disease and History of transplant   Endo:     (+) type I DM, Last HgA1c: 14.2, date: 9/25/20, Using insulin, - not using insulin pump.  (-) obesity   Psychiatric/Substance Use:     (+) psychiatric history anxiety and depression     Infectious Disease:     (+) Recent Fever,     Malignancy:  - neg malignancy ROS     Other:  - neg other ROS    (+) LMP: upt neg, ,         Physical Exam    Airway  airway exam normal      Mallampati: II   TM distance: > 3 FB   Neck ROM: full   Mouth opening: > 3 cm    Respiratory Devices and Support         Dental  no notable dental history         Cardiovascular   cardiovascular exam normal       Rhythm and rate: regular and normal     Pulmonary   pulmonary exam normal        breath sounds clear to auscultation           OUTSIDE LABS:  CBC:   Lab Results   Component Value Date    WBC 9.6 09/25/2022    HGB 13.3 09/25/2022    HCT 38.1 09/25/2022     09/25/2022     BMP:   Lab Results   Component Value Date     09/26/2022     (L) 09/26/2022    POTASSIUM 4.0 09/26/2022    POTASSIUM 3.7 09/26/2022    POTASSIUM 3.5 09/26/2022    CHLORIDE 104 09/26/2022    CHLORIDE 105 09/26/2022    CO2 25 09/26/2022    CO2 20 (L) 09/26/2022    BUN 3.0 (L) 09/26/2022    BUN 2.2 (L) 09/26/2022    CR 0.46 (L) 09/26/2022    CR 0.44 (L) 09/26/2022     (H) 09/26/2022     (H) 09/26/2022     (H) 09/26/2022     COAGS: No results found for: PTT, INR, FIBR  POC:   Lab Results   Component Value Date    HCG Negative 09/25/2022     HEPATIC:   Lab Results   Component Value Date    ALBUMIN 3.6 09/25/2022    PROTTOTAL 6.7 09/25/2022    ALT 7 (L) 09/25/2022    AST 13 09/25/2022    ALKPHOS 179 (H) 09/25/2022    BILITOTAL 0.3 09/25/2022     OTHER:   Lab  Results   Component Value Date    LACT 0.8 09/25/2022    A1C 14.2 (H) 09/25/2022    VERN 8.4 (L) 09/26/2022    PHOS 2.3 (L) 09/26/2022    MAG 2.0 09/26/2022    TSH 3.23 05/13/2021       Anesthesia Plan    ASA Status:  3      Anesthesia Type: General.     - Airway: ETT   Induction: Intravenous, Propofol.   Maintenance: Inhalation.        Consents    Anesthesia Plan(s) and associated risks, benefits, and realistic alternatives discussed. Questions answered and patient/representative(s) expressed understanding.     - Discussed: Risks, Benefits and Alternatives for BOTH SEDATION and the PROCEDURE were discussed     - Discussed with:  Patient      - Extended Intubation/Ventilatory Support Discussed: No.      - Patient is DNR/DNI Status: No    Use of blood products discussed: No .     Postoperative Care    Pain management: IV analgesics, Oral pain medications.   PONV prophylaxis: Ondansetron (or other 5HT-3), Background Propofol Infusion     Comments:                Layne Herrera MD

## 2022-09-27 NOTE — ANESTHESIA PROCEDURE NOTES
Airway       Patient location during procedure: OR  Staff -        CRNA: Abdirashid Baltazar APRN CRNA       Performed By: CRNA  Consent for Airway        Urgency: elective  Indications and Patient Condition       Indications for airway management: sosa-procedural       Induction type:intravenous       Mask difficulty assessment: 1 - vent by mask    Final Airway Details       Final airway type: endotracheal airway       Successful airway: ETT - single  Endotracheal Airway Details        ETT size (mm): 7.0       Cuffed: yes       Cuff volume (mL): 5       Successful intubation technique: video laryngoscopy and direct laryngoscopy       DL Blade Type: Davis 2       VL Blade Size: Glidescope 3       Grade View of Cords: 1       Adjucts: stylet and tooth guard       Position: Right       Measured from: lips       Secured at (cm): 21       Bite block used: None    Post intubation assessment        Placement verified by: capnometry, equal breath sounds and chest rise        Number of attempts at approach: 2       Number of other approaches attempted: 1       Secured with: silk tape       Ease of procedure: difficult       Dentition: Intact and Unchanged    Additional Comments       Very anterior airway, able to view posterior glottis under DL but unable to pass ETT, glidescope retrieved and ETT placed with ease. EASY mask ventilation. VSS throughout.

## 2022-09-27 NOTE — ANESTHESIA CARE TRANSFER NOTE
Patient: Kimberly Patterson    Procedure: Procedure(s):  EXAM UNDER ANESTHESIA, INCISION AND DRAINAGE JAIDEN-RECTAL ABSCESS, DRAIN PLACEMENT       Diagnosis: Perirectal abscess [K61.1]  Diagnosis Additional Information: No value filed.    Anesthesia Type:   General     Note:    Oropharynx: oropharynx clear of all foreign objects  Level of Consciousness: drowsy  Oxygen Supplementation: face mask  Level of Supplemental Oxygen (L/min / FiO2): 8  Independent Airway: airway patency satisfactory and stable  Dentition: dentition unchanged  Vital Signs Stable: post-procedure vital signs reviewed and stable  Report to RN Given: handoff report given  Patient transferred to: PACU    Handoff Report: Identifed the Patient, Identified the Reponsible Provider, Reviewed the pertinent medical history, Discussed the surgical course, Reviewed Intra-OP anesthesia mangement and issues during anesthesia, Set expectations for post-procedure period and Allowed opportunity for questions and acknowledgement of understanding      Vitals:  Vitals Value Taken Time   /53    Temp 97.5    Pulse 92 09/27/22 1554   Resp 12 09/27/22 1554   SpO2 100 % 09/27/22 1554   Vitals shown include unvalidated device data.    Electronically Signed By: JESSICA Gordon CRNA  September 27, 2022  3:56 PM

## 2022-09-27 NOTE — PROCEDURES
[facility]ON [facility] OPERATIVE NOTE    DATE OF SERVICE: 9/27/22     PROCEDURE NAME:   1. Rectal exam under anesthesia  2. Incision and drainage of abscess with drain placement     PRE-PROCEDURE DIAGNOSIS: perirectal abscess     POST-PROCEDURE DIAGNOSIS: posterior horseshoe perirectal abscess     SURGEON: La Lund MD     ASSISTANT: none     FINDINGS: posterior horseshoe perirectal abscess     ESTIMATED BLOOD LOSS: 5mL     ANESTHESIA: general with local     SPECIMEN: none    INDICATIONS FOR PROCEDURE:  Kimberly Patterson is a 22 year old female  presenting with rectal pain and DKA found to have a perirectal abscess. There was an attempt in the ER for bedside drainage but this was inadequate and she required sedation for better drainage.  The risks and benefits of surgery were discussed with the patient including bleeding, infection, damage to the sphincter complex with incontinence, fistula, need for further procedures. The patient verbalized understanding and consented to proceed.        DESCRIPTION OF PROCEDURE:  The patient was taken to the operating room and placed under general anesthesia. They were then placed in the prone sola knife position on the operating room table. The buttocks were taped apart. The surgical area was then prepped and draped in the usual sterile fashion. A timeout was performed per protocol. We then started with a perianal block with 0.25% marcaine with epi.  A digital rectal exam was performed which revealed no masses.  The bivalve anoscope was inserted and rectum circumferentially examined, there was no proctitis or obvious fistula opening. Her attempted I&D site was in the right posterior quadrant with somewhat purulent drainage. I probed this and felt that it was tracking through the deep post anal space into the left posterior quadrant. There were no other tracking or indurated areas. I therefore made a counter incision over the left posterior quadrant and a seton was placed  through the deep post anal space through the two counter incisions, secured in draining fashion with silk ties.   We again check for a fistula opening but there was no clear internal opening.    Hemostasis was achieved. Fluffs were placed for dressing. All sponge, needle and instrument counts were correct at the end of the procedure. The patient tolerated the procedure well and was awakened from anesthesia without difficulty, and transferred to the recovery room in stable condition.    La Lund MD  [facility]  [phone]

## 2022-09-27 NOTE — PROGRESS NOTES
The History and Physical has been reviewed, the patient has been examined and no changes have occurred in the patient's condition since the H & P was completed.       To or for I&D of abscess    La Lund MD

## 2022-09-27 NOTE — PROGRESS NOTES
Progress Note    Date of admission: 09/25/2022    Assessment/Plan  Kimberly Patterson is a 22 year old female with a medica history of type I diabetes presents with rectal pain for 3 days. Found to have perirectal abscess and DKA.      Type I diabetes with DKA  - S/p DKA treatment.  -Continue lantus 25 units qam and Novolog 1:10 carb count at mealtime.   - She stopped using her insulin pump since Nov 2021 after her glucose sensor was broken.  - - had Follow up with her endocrinologist on 9/27/22  scheduled.   Needs to be  rescheduled    Hypokalemia, hypomagnesemia and hypophosphatemia:   -Replace per protocol.      Perirectal abscess:   -- s/p I&D in ER  -Colorectal surgery consulted.  They did I&D in the OR today.  They found a horseshoe posterior perirectal abscess.  - Continue Zosyn  -Postoperative management as per surgery team.    DVT PPX : SCD    Barriers to discharge: Perianal abscess, IV antibiotics    Anticipated Discharge date  : 1-2 days     Subjective  No distress noted.  Management plan discussed with the patient and her partner at the bedside.    Objective  Vital signs in last 24 hours  Temp:  [97.5  F (36.4  C)-99.9  F (37.7  C)] 97.5  F (36.4  C)  Pulse:  [] 77  Resp:  [10-18] 16  BP: ()/(53-83) 114/79  SpO2:  [97 %-100 %] 99 %    Input and Output in 24 hrs     Intake/Output Summary (Last 24 hours) at 9/26/2022 1659  Last data filed at 9/26/2022 1600  Gross per 24 hour   Intake 1715 ml   Output 2000 ml   Net -285 ml       Physical Exam:  GEN: Alert and oriented. Not in acute distress  HEENT: Mucous membrane- moist and pink  CHEST: Clear to auscultation bilaterally  HEART: S1S2 regular. No murmurs, rubs or gallops  ABDOMEN: Soft. Non-tender, Bowel sounds- active, severe tenderness on the left side of gluteal fold  Extremities: No pedal oedema  CNS: No focal neurological deficit. No involuntary movements  SKIN: No skin rash, no cyanosis or clubbing      Pertinent Labs   Lab Results: Personally  Reviewed    Recent Results (from the past 24 hour(s))   Basic metabolic panel    Collection Time: 09/26/22  5:29 PM   Result Value Ref Range    Sodium 139 136 - 145 mmol/L    Potassium 4.0 3.4 - 5.3 mmol/L    Chloride 104 98 - 107 mmol/L    Carbon Dioxide (CO2) 25 22 - 29 mmol/L    Anion Gap 10 7 - 15 mmol/L    Urea Nitrogen 3.0 (L) 6.0 - 20.0 mg/dL    Creatinine 0.46 (L) 0.51 - 0.95 mg/dL    Calcium 8.4 (L) 8.6 - 10.0 mg/dL    Glucose 125 (H) 70 - 99 mg/dL    GFR Estimate >90 >60 mL/min/1.73m2   Glucose by meter    Collection Time: 09/26/22  5:29 PM   Result Value Ref Range    GLUCOSE BY METER POCT 127 (H) 70 - 99 mg/dL   Glucose by meter    Collection Time: 09/26/22 10:12 PM   Result Value Ref Range    GLUCOSE BY METER POCT 317 (H) 70 - 99 mg/dL   Magnesium    Collection Time: 09/27/22  4:09 AM   Result Value Ref Range    Magnesium 1.7 1.7 - 2.3 mg/dL   Phosphorus    Collection Time: 09/27/22  4:09 AM   Result Value Ref Range    Phosphorus 3.4 2.5 - 4.5 mg/dL   CBC with platelets    Collection Time: 09/27/22  4:09 AM   Result Value Ref Range    WBC Count 6.2 4.0 - 11.0 10e3/uL    RBC Count 3.80 3.80 - 5.20 10e6/uL    Hemoglobin 11.7 11.7 - 15.7 g/dL    Hematocrit 33.7 (L) 35.0 - 47.0 %    MCV 89 78 - 100 fL    MCH 30.8 26.5 - 33.0 pg    MCHC 34.7 31.5 - 36.5 g/dL    RDW 13.1 10.0 - 15.0 %    Platelet Count 234 150 - 450 10e3/uL   Basic metabolic panel    Collection Time: 09/27/22  4:09 AM   Result Value Ref Range    Sodium 139 136 - 145 mmol/L    Potassium 3.6 3.4 - 5.3 mmol/L    Chloride 103 98 - 107 mmol/L    Carbon Dioxide (CO2) 34 (H) 22 - 29 mmol/L    Anion Gap 2 (L) 7 - 15 mmol/L    Urea Nitrogen 3.8 (L) 6.0 - 20.0 mg/dL    Creatinine 0.42 (L) 0.51 - 0.95 mg/dL    Calcium 8.6 8.6 - 10.0 mg/dL    Glucose 270 (H) 70 - 99 mg/dL    GFR Estimate >90 >60 mL/min/1.73m2   Glucose by meter    Collection Time: 09/27/22  7:54 AM   Result Value Ref Range    GLUCOSE BY METER POCT 256 (H) 70 - 99 mg/dL   Glucose by meter     Collection Time: 09/27/22 12:30 PM   Result Value Ref Range    GLUCOSE BY METER POCT 148 (H) 70 - 99 mg/dL   Glucose by meter    Collection Time: 09/27/22  4:03 PM   Result Value Ref Range    GLUCOSE BY METER POCT 172 (H) 70 - 99 mg/dL       Pertinent Radiology   Radiology Results reviewed      EKG Results reviewed       Advanced Care Planning      Jordan Sellers MD   [facility]

## 2022-09-28 VITALS
WEIGHT: 133 LBS | RESPIRATION RATE: 18 BRPM | BODY MASS INDEX: 22.16 KG/M2 | TEMPERATURE: 98.2 F | HEIGHT: 65 IN | OXYGEN SATURATION: 98 % | DIASTOLIC BLOOD PRESSURE: 76 MMHG | SYSTOLIC BLOOD PRESSURE: 113 MMHG | HEART RATE: 76 BPM

## 2022-09-28 LAB
ANION GAP SERPL CALCULATED.3IONS-SCNC: 11 MMOL/L (ref 7–15)
BUN SERPL-MCNC: 3.3 MG/DL (ref 6–20)
CALCIUM SERPL-MCNC: 9.1 MG/DL (ref 8.6–10)
CHLORIDE SERPL-SCNC: 102 MMOL/L (ref 98–107)
CREAT SERPL-MCNC: 0.37 MG/DL (ref 0.51–0.95)
DEPRECATED HCO3 PLAS-SCNC: 27 MMOL/L (ref 22–29)
ERYTHROCYTE [DISTWIDTH] IN BLOOD BY AUTOMATED COUNT: 12.8 % (ref 10–15)
GFR SERPL CREATININE-BSD FRML MDRD: >90 ML/MIN/1.73M2
GLUCOSE BLDC GLUCOMTR-MCNC: 214 MG/DL (ref 70–99)
GLUCOSE BLDC GLUCOMTR-MCNC: 221 MG/DL (ref 70–99)
GLUCOSE SERPL-MCNC: 218 MG/DL (ref 70–99)
HCT VFR BLD AUTO: 35.9 % (ref 35–47)
HGB BLD-MCNC: 12.6 G/DL (ref 11.7–15.7)
MAGNESIUM SERPL-MCNC: 2.1 MG/DL (ref 1.7–2.3)
MCH RBC QN AUTO: 31.3 PG (ref 26.5–33)
MCHC RBC AUTO-ENTMCNC: 35.1 G/DL (ref 31.5–36.5)
MCV RBC AUTO: 89 FL (ref 78–100)
PHOSPHATE SERPL-MCNC: 4.5 MG/DL (ref 2.5–4.5)
PLATELET # BLD AUTO: 289 10E3/UL (ref 150–450)
POTASSIUM SERPL-SCNC: 3.9 MMOL/L (ref 3.4–5.3)
RBC # BLD AUTO: 4.02 10E6/UL (ref 3.8–5.2)
SODIUM SERPL-SCNC: 140 MMOL/L (ref 136–145)
WBC # BLD AUTO: 7.4 10E3/UL (ref 4–11)

## 2022-09-28 PROCEDURE — 85014 HEMATOCRIT: CPT | Performed by: STUDENT IN AN ORGANIZED HEALTH CARE EDUCATION/TRAINING PROGRAM

## 2022-09-28 PROCEDURE — 84100 ASSAY OF PHOSPHORUS: CPT | Performed by: SURGERY

## 2022-09-28 PROCEDURE — 99239 HOSP IP/OBS DSCHRG MGMT >30: CPT | Performed by: INTERNAL MEDICINE

## 2022-09-28 PROCEDURE — 83735 ASSAY OF MAGNESIUM: CPT | Performed by: SURGERY

## 2022-09-28 PROCEDURE — 250N000013 HC RX MED GY IP 250 OP 250 PS 637: Performed by: STUDENT IN AN ORGANIZED HEALTH CARE EDUCATION/TRAINING PROGRAM

## 2022-09-28 PROCEDURE — 250N000013 HC RX MED GY IP 250 OP 250 PS 637: Performed by: INTERNAL MEDICINE

## 2022-09-28 PROCEDURE — 80048 BASIC METABOLIC PNL TOTAL CA: CPT | Performed by: STUDENT IN AN ORGANIZED HEALTH CARE EDUCATION/TRAINING PROGRAM

## 2022-09-28 PROCEDURE — 36415 COLL VENOUS BLD VENIPUNCTURE: CPT | Performed by: STUDENT IN AN ORGANIZED HEALTH CARE EDUCATION/TRAINING PROGRAM

## 2022-09-28 PROCEDURE — 250N000011 HC RX IP 250 OP 636: Performed by: INTERNAL MEDICINE

## 2022-09-28 RX ORDER — CHLORPHENIR/PHENYLEPH/ASPIRIN 2-7.8-325
1 TABLET, EFFERVESCENT ORAL PRN
Qty: 50 STRIP | Refills: 0 | Status: SHIPPED | OUTPATIENT
Start: 2022-09-28

## 2022-09-28 RX ORDER — LANCETS
1 EACH MISCELLANEOUS
Qty: 100 EACH | Refills: 0 | Status: SHIPPED | OUTPATIENT
Start: 2022-09-28

## 2022-09-28 RX ORDER — GLUCOSAMINE HCL/CHONDROITIN SU 500-400 MG
CAPSULE ORAL
Qty: 100 EACH | Refills: 0 | Status: SHIPPED | OUTPATIENT
Start: 2022-09-28

## 2022-09-28 RX ADMIN — PIPERACILLIN AND TAZOBACTAM 3.38 G: 3; .375 INJECTION, POWDER, LYOPHILIZED, FOR SOLUTION INTRAVENOUS at 04:21

## 2022-09-28 RX ADMIN — ACETAMINOPHEN 650 MG: 325 TABLET, FILM COATED ORAL at 00:33

## 2022-09-28 RX ADMIN — POTASSIUM CHLORIDE 20 MEQ: 1.5 POWDER, FOR SOLUTION ORAL at 08:43

## 2022-09-28 ASSESSMENT — ACTIVITIES OF DAILY LIVING (ADL)
ADLS_ACUITY_SCORE: 23

## 2022-09-28 NOTE — PROGRESS NOTES
Colon and Rectal Surgery  Daily Progress Note    Subjective  No acute events overnight. Reports pain and pressure much improved since surgery. Minimal drainage overnight, dressing changed. Eager to go home.     Objective  Intake/Output last 24 hrs:    Intake/Output Summary (Last 24 hours) at 9/28/2022 0750  Last data filed at 9/28/2022 0400  Gross per 24 hour   Intake 1260 ml   Output 3800 ml   Net -2540 ml     Temp:  [97.5  F (36.4  C)-98.8  F (37.1  C)] 97.6  F (36.4  C)  Pulse:  [61-87] 61  Resp:  [10-22] 17  BP: (102-124)/(66-88) 122/78  SpO2:  [97 %-100 %] 100 %    Physical Exam:  General: awake, alert, laying in bed, in no acute distress  Head: normocephalic, atraumatic  Respiratory: non-labored breathing  Rectal: Incision site remains patent with drain in place. Small amount of bloody drainage on dressings. Wound bed appears healthy without surrounding erythema or induration.    Skin: No rashes or lesions  Musculoskeletal: moves all four extremities equally  Psychological: alert and oriented, answers questions appropriately    Pertinent Labs  Lab Results: personally reviewed.  Lab Results   Component Value Date     09/28/2022     09/27/2022     09/26/2022     05/13/2021    CO2 27 09/28/2022    CO2 34 09/27/2022    CO2 25 09/26/2022    CO2 27 05/13/2021    BUN 3.3 09/28/2022    BUN 3.8 09/27/2022    BUN 3.0 09/26/2022    BUN 15 05/13/2021     Lab Results   Component Value Date    WBC 7.4 09/28/2022    WBC 6.2 09/27/2022    WBC 9.6 09/25/2022    HGB 12.6 09/28/2022    HGB 11.7 09/27/2022    HGB 13.3 09/25/2022    HCT 35.9 09/28/2022    HCT 33.7 09/27/2022    HCT 38.1 09/25/2022    MCV 89 09/28/2022    MCV 89 09/27/2022    MCV 89 09/25/2022     09/28/2022     09/27/2022     09/25/2022       Assessment/Plan: This is a 22 year old female POD #1 s/p EUA I&D with drain placement of posterior horseshoe perirectal abscess. Wound appears healthy and patent.    Labs remain  stable    -Continue dressing changes and wound care at least 2x daily and prn  -Would recommend 1 week course of Augmentin on discharge  -Discharge instructions placed in discharge navigator  -CRS will help to arrange clinic follow-up in 1-2 weeks  -OK to discharge from CRS standpoint once medically stable    Discussed with MISTI RenteriaC  [facility]  [phone]..............................main

## 2022-09-28 NOTE — PROGRESS NOTES
Pt discharged to home with boyfriends mom.  All discharge paperwork completed and explained.  Pt verbalizes understanding and states no concerns re: discharge

## 2022-09-28 NOTE — PLAN OF CARE
Problem: Plan of Care - These are the overarching goals to be used throughout the patient stay.    Goal: Optimal Comfort and Wellbeing  Outcome: Ongoing, Progressing     Problem: Risk for Delirium  Goal: Optimal Coping  Outcome: Ongoing, Progressing     Problem: Risk for Delirium  Goal: Improved Sleep  Outcome: Ongoing, Progressing     Problem: Pain Acute  Goal: Acceptable Pain Control and Functional Ability  Outcome: Ongoing, Progressing     Problem: Diabetic Ketoacidosis  Goal: Fluid and Electrolyte Balance with Absence of Ketosis  Outcome: Ongoing, Progressing   Goal Outcome Evaluation:    [facility] - ICU    RN Progress Note:            Pertinent Assessments:      Please refer to flowsheet rows for full assessment     Afebrile. VSS WNL. A/o x 4; pleasant and cooperative. C/o pain x 1; rated pain 2/10. Pain well controlled with tylenol. Lung sounds clear. Sinus rhythm.          Mobility Level:     5         Key Events - This Shift:     N/a              Plan:     Continue monitoring dressing; reinforce as needed. Manage pain.       Patient slept well throughout the night. Pain well controlled with prn tylenol. Up to bathroom x 1 with large void (1000 mL+). Spot checked blood sugar at 0030; 221. 0352 lab draw resulted with b.s. 218. Patient expressed frustration with sugar levels. She is looking forward to going home. No new concerns noted this shift.

## 2022-09-28 NOTE — PROGRESS NOTES
Care Management Discharge Note    Discharge Date: 09/28/2022     Discharge Disposition: Home    Discharge Services: None    Discharge DME: None    Discharge Transportation:  (Family)    Private pay costs discussed: Not applicable    PAS Confirmation Code:  (NA)  Patient/family educated on Medicare website which has current facility and service quality ratings: no    Education Provided on the Discharge Plan:    Persons Notified of Discharge Plans: Nursing;   Patient/Family in Agreement with the Plan: yes    Handoff Referral Completed: No    Additional Information:  Discharge to home with family support. No care management needs identified.     Nanette Diaz RN

## 2022-09-28 NOTE — DISCHARGE SUMMARY
[facility]  Hospitalist Discharge Summary      Date of Admission:  9/25/2022  Date of Discharge:  9/28/2022  Discharging Provider: Abdirashid Jacobson MD  Discharge Service: Hospitalist Service    Discharge Diagnoses     Principal Problem:    Perirectal abscess  Active Problems:    Diabetes mellitus type 1 (H)    DKA (diabetic ketoacidosis) (H)      Follow-ups Needed After Discharge   Follow-up Appointments     Follow-up and recommended labs and tests       * Follow up with your endocrinologist ASAP  * Follow up with colorectal surgery in 1-2 weeks           Discharge Disposition   Discharged to home  Condition at discharge: Stable      Hospital Course     Kimberly Patterson is a 22 year old female with a medica history of type I diabetes presented to ER on 9/25/22 with rectal pain for 3 days. She was found to have perirectal abscess and DKA. She received incision and drainage in ER. Zosyn was started. Patient also received insulin drip and IVF for her DKA. Hypokalemia, hypomagnesemia and hypophosphatemia were all corrected. Patient reports that she stopped using her insulin pump since Nov 2021 after her glucose sensor was broken. She also admits that although she uses her Lantus daily, she does not always administer Novolog according to carb count during mealtime. After DKA was resolved, Lantus was resumed and the dose was increased from 20 units to 25 units daily. Colorectal surgery was consulted. Patient received EUA I&D with drain placement of posterior horseshoe perirectal abscess. Patient was discharged home on 9/28/22 in a stable condition. She was recommended to complete Augmentin for one week and follow up in the colorectal surgeyr clinic in 1-2 weeks. Patient was also advised to follow up with her endocrinologist regarding her insulin regimen.        Consultations This Hospital Stay   DIABETES EDUCATION IP CONSULT  SURGERY GENERAL IP CONSULT  COLORECTAL SURGERY IP CONSULT    Code Status    Full Code    Time Spent on this Encounter   I, Abdirashid Jacobson MD, personally saw the patient today and spent greater than 30 minutes discharging this patient.       Abdirashid Jacobson MD  [address] [address]  Phone: [phone]  Fax: [phone]  ______________________________________________________________________    Physical Exam   Vital Signs: Temp: 98.2  F (36.8  C) Temp src: Oral BP: 113/76 Pulse: 76   Resp: 18 SpO2: 98 % O2 Device: None (Room air) Oxygen Delivery: 6 LPM  Weight: 133 lbs 0 oz    General appearance: not in acute distress  HEENT: PERRL, EOMI  Lungs: Clear breath sounds in bilateral lung fields  Cardiovascular: Regular rate and rhythm, normal S1-S2  Abdomen: Soft, non tender, no distension  Musculoskeletal: No joint swelling  Skin: No rash and no edema  Neurology: AAO ×3.  Cranial nerves II - XII normal.  Normal muscle strength in all four extremities.       Primary Care Physician   Emily Moyer    Discharge Orders      Primary Care - Care Coordination Referral      Activity    You have just undergone anorectal surgery.  Here are a few things to expect after your surgery:  1) Everybody has pain - this is expected. It is important that your pain plan start with a combination of non-medication therapies and non-opioid medication therapies. Non-medication options include ice, heat, and light stretching. You can take Tylenol 1000mg every 6 hours and Ibuprofen 600mg every 6 hours with food, staggering the medications so you can take something for pain every 3 hours. Do not take Ibuprofen if you have Crohn's disease, Ulcerative colitis, kidney disease or have had an adverse reaction in the past. The maximum dose of Tylenol is 4,000 mg or 4 g daily. Please make sure other medications you take do not contain the same active ingredient as in Tylenol. Call your PCP if you have questions about medication. The opioid pain medication should  be used if your pain is significant enough that you are not able to move as this is important for recovery. In general, pain medication works better if you do not wait until you are in severe pain before using it, as it takes 30-60 minutes to take effect. For your safety, take the lowest dose possible for the shortest amount of time. For surgical patients with severe pain, addiction is rare when opioids are used for 5 days or less. We base the quantity of opioid medication based on standards established by Department of Health and American College of Surgeons. This can affect the number of opioid pills you are able to receive for post-surgical pain. If you do not feel that your pain is being adequately managed with your prescriptions, please call our office. Many patients also find it helps tosoak in a warm tub for at least 15 minutes at a time, three to four times a day if possible.  2) Everybody has some spotting or mild amounts of bleeding/bloody drainage.  If you see more significant bleeding, try holding some pressure over the wound for 15-20 minutes.   If you pass more than a cup full of blood or youcannot get the bleeding to stop by holding pressure, call the office.  3) Infections rarely occur in this area after surgery.  If you see small amounts of yellowish/pus like drainage in the days following surgery, this is expected.  It is also normal to run a low grade fever (below 101.5 F) following surgery.  4) You will feel numb in the anorectal area for 4-6 hours after surgery due to the numbing medication used in the operating room.  It is possible you may experience some fecal incontinence (accidental passage of gas/stool) during this time.  The numbness will resolve on its own.  Once you feel sensation returning, you should consider taking something for pain as you can expect oral medications to take 30-60 minutes before you start feeling theireffects.  5) There is generally no specific wound care necessary  immediately after surgery.  Simply showering/bathing 1-2 times a day and after bowel movements is sufficient to keep the wounds clean.  You may want to keep a dry gauze/pad over the wound site as it is normal to have some amount of drainage, and this drainage can be irritating to the skin.  If you have a packing in the wound, your surgeon will give you more specific instructions on how to manage this.  6) For some operations you may have stitches in your wound.  Those stitches almost always break within a few days of surgery.  If you feel a pop or the wound opens - this is OK.  The wound will continue to fill in on its own.It is still ok to shower/bathe as normal.  Expect some amount of drainage if the wound is open.  7) Do not allow yourself to go more than 2 or 3 days after surgery without having had a bowel movement.  If you wait too long, the first bowel movement may be unpleasant.  If you haven't had a bowel after 2 days, take something by mouth to help you go.  Here are a few options: - Milk of Magnesia 30 mL every 8 hours as necessary - Miralax 1-2 capfuls daily as necessary - Senna 1-2 tabs twice a day as necessary  8) Your only activity restrictions are no driving while you are taking narcotics.  You may want to avoid heavy lifting/strenuous exercise for the first 1-2 days after surgery, but if you feel up to resuming normal activities/exercise, this is ok.  9) Lastly, if you have any questions or concerns - PLEASE CALL ([phone])!  Our office has someone on call 24 hours a day.  If your question is one that can wait until normal business hours (Mon-Fri 8:30AM-5PM), it is better to wait until the daytime as someone more familiar with your care will be able to help you.  However, if it is an emergency, the on-call surgeon will be able to give you good advice.     Reason for your hospital stay    * Admitted for rectal abscess and DKA.     Follow-up and recommended labs and tests     * Follow up with your  endocrinologist ASAP  * Follow up with colorectal surgery in 1-2 weeks     Activity    Your activity upon discharge: activity as tolerated     Diet    Follow this diet upon discharge: Moderate Consistent Carb (60 g CHO per Meal) Diet       Significant Results and Procedures   Most Recent 3 CBC's:Recent Labs   Lab Test 09/28/22  0355 09/27/22  0409 09/25/22  1432   WBC 7.4 6.2 9.6   HGB 12.6 11.7 13.3   MCV 89 89 89    234 287     Most Recent 3 BMP's:Recent Labs   Lab Test 09/28/22  0807 09/28/22  0355 09/28/22  0027 09/27/22  0754 09/27/22  0409 09/26/22  2212 09/26/22  1729   NA  --  140  --   --  139  --  139   POTASSIUM  --  3.9  --   --  3.6  --  4.0   CHLORIDE  --  102  --   --  103  --  104   CO2  --  27  --   --  34*  --  25   BUN  --  3.3*  --   --  3.8*  --  3.0*   CR  --  0.37*  --   --  0.42*  --  0.46*   ANIONGAP  --  11  --   --  2*  --  10   VERN  --  9.1  --   --  8.6  --  8.4*   * 218* 221*   < > 270*   < > 127*  125*    < > = values in this interval not displayed.       Discharge Medications   Current Discharge Medication List      START taking these medications    Details   alcohol swab prep pads Use to swab area of injection/connor as directed.  Qty: 100 each, Refills: 0    Associated Diagnoses: Type 1 diabetes mellitus with hyperglycemia (H)      amoxicillin-clavulanate (AUGMENTIN) 875-125 MG tablet Take 1 tablet by mouth 2 times daily for 7 days  Qty: 14 tablet, Refills: 0    Associated Diagnoses: Perirectal abscess      blood glucose calibration (NO BRAND SPECIFIED) solution To accompany: AccuChek Guide Me Glucose meter  Qty: 1 each, Refills: 0    Associated Diagnoses: Type 1 diabetes mellitus with hyperglycemia (H)      blood glucose monitoring (NO BRAND SPECIFIED) meter device kit Use to test blood sugar 4 times daily or as directed. Preferred blood glucose meter OR supplies to accompany: Accuchek Guide me glucose meter.  Qty: 1 kit, Refills: 0    Associated Diagnoses: Type 1  "diabetes mellitus with hyperglycemia (H)         CONTINUE these medications which have CHANGED    Details   Acetone, Urine, Test (KETONE TEST) STRP 1 each as needed (Concerning elevated ketone)  Qty: 50 strip, Refills: 0    Associated Diagnoses: Type 1 diabetes mellitus with hyperglycemia (H)      blood glucose (NO BRAND SPECIFIED) test strip Use to test blood sugar 4 times daily or as directed. AccuChek Guide me strip.  Qty: 100 strip, Refills: 0    Associated Diagnoses: Type 1 diabetes mellitus with hyperglycemia (H)      insulin glargine (LANTUS PEN) 100 UNIT/ML pen Inject 25 Units Subcutaneous every morning  Qty: 15 mL, Refills: 0    Comments: If Lantus is not covered by insurance, may substitute Basaglar or Semglee or other insulin glargine product per insurance preference at same dose and frequency.    Associated Diagnoses: Type 1 diabetes mellitus with hyperglycemia (H)      thin (NO BRAND SPECIFIED) lancets 1 each 4 times daily (before meals and nightly) Use with lanceting device. To accompany: Blood Glucose Monitor Brands: Accu Chek Guide Me meter  Qty: 100 each, Refills: 0    Associated Diagnoses: Type 1 diabetes mellitus with hyperglycemia (H)         CONTINUE these medications which have NOT CHANGED    Details   etonogestrel (NEXPLANON) 68 MG IMPL 1 each by Subdermal route once      glucagon 1 MG kit Use as directed in case of low blood sugar resulting in seizure or unconsciousness      insulin aspart (NOVOLOG VIAL) 100 UNITS/ML vial 1 unit / 10g carbs      multivitamin, therapeutic (THERA-VIT) TABS tablet Take 1 tablet by mouth daily      insulin pen needle (32G X 4 MM) 32G X 4 MM miscellaneous Use as directed 4-6 needles per day      insulin syringe-needle U-100 (31G X 5/16\" 0.5 ML) 31G X 5/16\" 0.5 ML miscellaneous Use 4-6 syringes per day in case of insulin pump malfuntion           Allergies   Allergies   Allergen Reactions     Ketorolac Difficulty breathing     "

## 2022-09-29 ENCOUNTER — PATIENT OUTREACH (OUTPATIENT)
Dept: CARE COORDINATION | Facility: CLINIC | Age: 22
End: 2022-09-29

## 2022-09-29 NOTE — PROGRESS NOTES
Clinic Care Coordination Contact  Community Health Worker Initial Outreach    Patient accepts CC: No, Pt declined needs for extra support     Clinic Care Coordination Contact  [facility]: Post-Discharge Note  SITUATION                                                      Admission:    Admission Date: 09/25/22   Reason for Admission: Perirectal abscess    DKA (diabetic ketoacidosis) (H)  Discharge:   Discharge Date: 09/28/22  Discharge Diagnosis: Perirectal abscess    DKA (diabetic ketoacidosis) (H)    BACKGROUND                                                      Per hospital discharge summary and inpatient provider notes:    Kimberly Patterson is a 22 year old female with a medica history of type I diabetes presents with rectal pain for 3 days. Found to have perirectal abscess and DKA.     ASSESSMENT         Discharge Assessment  How are you doing now that you are home?: doing well  How are your symptoms? (Red Flag symptoms escalate to triage hotline per guidelines): Improved  Do you feel your condition is stable enough to be safe at home until your provider visit?: Yes  Does the patient have their discharge instructions? : Yes  Does the patient have questions regarding their discharge instructions? : No  Were you started on any new medications or were there changes to any of your previous medications? : Yes  Does the patient have all of their medications?: Yes  Do you have questions regarding any of your medications? : No  Do you have all of your needed medical supplies or equipment (DME)?  (i.e. oxygen tank, CPAP, cane, etc.): Yes  Discharge follow-up appointment scheduled within 14 calendar days? : No  Is patient agreeable to assistance with scheduling? : No (waiting call from specailist clinic to schedule appt for her.)    Post-op ([facility] CTA Only)  If the patient had a surgery or procedure, do they have any questions for a nurse?: No         PLAN                                                      Outpatient  Plan:     No future appointments.    Follow-up and recommended labs and tests  * Follow up with your endocrinologist ASAP  * Follow up with colorectal surgery in 1-2 weeks    For any urgent concerns, please contact our 24 hour nurse triage line: [phone]       SREE Christie  [phone]  [facility]

## 2022-12-06 ENCOUNTER — OFFICE VISIT (OUTPATIENT)
Dept: FAMILY MEDICINE | Facility: CLINIC | Age: 22
End: 2022-12-06
Payer: COMMERCIAL

## 2022-12-06 VITALS
DIASTOLIC BLOOD PRESSURE: 77 MMHG | RESPIRATION RATE: 12 BRPM | TEMPERATURE: 98.5 F | OXYGEN SATURATION: 97 % | HEART RATE: 98 BPM | SYSTOLIC BLOOD PRESSURE: 118 MMHG

## 2022-12-06 DIAGNOSIS — K61.1 PERIRECTAL ABSCESS: Primary | ICD-10-CM

## 2022-12-06 PROCEDURE — 99213 OFFICE O/P EST LOW 20 MIN: CPT | Performed by: FAMILY MEDICINE

## 2022-12-06 NOTE — PROGRESS NOTES
(K61.1) Perirectal abscess  (primary encounter diagnosis)  Comment:     Onset September 25.  A soft plastic ring was still in place where the horseshoe abscess was drained.  The plan was to remove this ring at a later date.  Area looked quiet so ring was removed.    Plan:   Advised patient to wash in the shower or soak twice daily for about a week and observe carefully.  Any sign of recurrence such as redness, swelling, drainage she should return for follow-up examination.  She voices understanding of recommendations.      CHIEF COMPLAINT    Follow-up perirectal abscess.      HISTORY    This patient is a type I diabetic.  She had a perirectal abscess back 9- accompanied by DKA.  She required hospital admission.  Ultimately exam under anesthesia and placement of a drainage device was performed.  Currently not having any tenderness or significant drainage.  She would like to have this drain removed and that was the original plan.      REVIEW OF SYSTEMS    No fevers.  No breathing problems.  No abdominal pain or rectal pain.      EXAM  /77   Pulse 98   Temp 98.5  F (36.9  C) (Oral)   Resp 12   SpO2 97%     A blue soft plastic ring was present just superior to the anus, looping through both sides and underneath between them.    The drain was snipped and removed uneventfully.  There was no observed redness or induration or fluctuance or significant drainage at this time.  Well-tolerated.

## 2023-01-19 ENCOUNTER — OFFICE VISIT (OUTPATIENT)
Dept: OPTOMETRY | Facility: CLINIC | Age: 23
End: 2023-01-19
Payer: COMMERCIAL

## 2023-01-19 DIAGNOSIS — H52.221 REGULAR ASTIGMATISM OF RIGHT EYE: ICD-10-CM

## 2023-01-19 DIAGNOSIS — H52.13 MYOPIA OF BOTH EYES: ICD-10-CM

## 2023-01-19 DIAGNOSIS — E10.9 TYPE 1 DIABETES MELLITUS WITHOUT RETINOPATHY (H): ICD-10-CM

## 2023-01-19 DIAGNOSIS — Z01.01 ENCOUNTER FOR EXAMINATION OF EYES AND VISION WITH ABNORMAL FINDINGS: Primary | ICD-10-CM

## 2023-01-19 PROCEDURE — 92015 DETERMINE REFRACTIVE STATE: CPT | Performed by: OPTOMETRIST

## 2023-01-19 PROCEDURE — 92014 COMPRE OPH EXAM EST PT 1/>: CPT | Performed by: OPTOMETRIST

## 2023-01-19 ASSESSMENT — EXTERNAL EXAM - RIGHT EYE: OD_EXAM: NORMAL

## 2023-01-19 ASSESSMENT — VISUAL ACUITY
METHOD: SNELLEN - LINEAR
OD_CC: 20/20-1
CORRECTION_TYPE: GLASSES
OD_SC: 20/300
OD_SC: 20/20-1
OS_CC: 20/20-1
OD_CC: 20/25
OS_SC: 20/300
OS_SC: 20/20-1
OS_CC: 20/20

## 2023-01-19 ASSESSMENT — REFRACTION_MANIFEST
OD_CYLINDER: +0.50
OS_SPHERE: -3.00
OS_CYLINDER: SPHERE
OD_AXIS: 085
OD_SPHERE: -3.75

## 2023-01-19 ASSESSMENT — CONF VISUAL FIELD
OS_SUPERIOR_NASAL_RESTRICTION: 0
OD_NORMAL: 1
OD_SUPERIOR_TEMPORAL_RESTRICTION: 0
OD_SUPERIOR_NASAL_RESTRICTION: 0
METHOD: COUNTING FINGERS
OS_NORMAL: 1
OS_SUPERIOR_TEMPORAL_RESTRICTION: 0
OD_INFERIOR_TEMPORAL_RESTRICTION: 0
OD_INFERIOR_NASAL_RESTRICTION: 0
OS_INFERIOR_NASAL_RESTRICTION: 0
OS_INFERIOR_TEMPORAL_RESTRICTION: 0

## 2023-01-19 ASSESSMENT — SLIT LAMP EXAM - LIDS
COMMENTS: NORMAL
COMMENTS: NORMAL

## 2023-01-19 ASSESSMENT — REFRACTION_WEARINGRX
SPECS_TYPE: SVL
OS_SPHERE: -2.75
OD_CYLINDER: +0.50
OS_CYLINDER: SPHERE
OD_SPHERE: -3.25
OD_AXIS: 092

## 2023-01-19 ASSESSMENT — CUP TO DISC RATIO
OD_RATIO: 0.55
OS_RATIO: 0.45

## 2023-01-19 ASSESSMENT — TONOMETRY
IOP_METHOD: APPLANATION
OS_IOP_MMHG: 18
OD_IOP_MMHG: 16

## 2023-01-19 ASSESSMENT — EXTERNAL EXAM - LEFT EYE: OS_EXAM: NORMAL

## 2023-01-19 NOTE — LETTER
1/19/2023         RE: Kimberly Patterson  [address]  [facility] [address]        Dear Colleague,    Thank you for referring your patient, Kimberly Patterson, to the [facility]. Please see a copy of my visit note below.    Chief Complaint   Patient presents with     Diabetic Eye Exam        Chief Complaint(s) and History of Present Illness(es)     Diabetic Eye Exam            Vision: is stable    Diabetes Type: Type 1 and on insulin    Duration: 14 years    Blood Sugars: is controlled (Checks 2-3x/day)               Lab Results   Component Value Date    A1C 14.2 09/25/2022    A1C 8.2 09/21/2021    A1C 10.7 05/13/2021       Last Eye Exam: 7/2/2021  Dilated Previously: Yes, side effects of dilation explained today    What are you currently using to see?  Glasses - wears full time. Has tried CL's in past but did not like them     Distance Vision Acuity: Satisfied with vision    Near Vision Acuity: Satisfied with vision while reading and using computer with glasses    Eye Comfort: good  Do you use eye drops? : No  Occupation or Hobbies:     [employer]     Medical, surgical and family histories reviewed and updated 1/19/2023.       OBJECTIVE: See Ophthalmology exam    ASSESSMENT:    ICD-10-CM    1. Encounter for examination of eyes and vision with abnormal findings  Z01.01       2. Type 1 diabetes mellitus without retinopathy (H)  E10.9       3. Myopia of both eyes  H52.13       4. Regular astigmatism of right eye  H52.221           PLAN:    Kimberly Patterson aware  eye exam results will be sent to Emily Moyer.  Patient Instructions   Patient educated on importance of good blood sugar control.  Letter sent to primary care provider with diabetic eye exam report.     Kimberly was advised of today's exam findings.  Optional to fill new glasses prescription, minimal change  Copy of glasses Rx provided today.    Return in 1 year for eye exam, or sooner if needed.    The effects of  the dilating drops last for 4- 6 hours.  You will be more sensitive to light and vision will be blurry up close.  Mydriatic sunglasses were given if needed.       Azam Herring O.D.  [address]  [address]    [phone]               Again, thank you for allowing me to participate in the care of your patient.        Sincerely,        Azam Herring OD

## 2023-01-19 NOTE — PROGRESS NOTES
Chief Complaint   Patient presents with     Diabetic Eye Exam        Chief Complaint(s) and History of Present Illness(es)     Diabetic Eye Exam            Vision: is stable    Diabetes Type: Type 1 and on insulin    Duration: 14 years    Blood Sugars: is controlled (Checks 2-3x/day)               Lab Results   Component Value Date    A1C 14.2 09/25/2022    A1C 8.2 09/21/2021    A1C 10.7 05/13/2021       Last Eye Exam: 7/2/2021  Dilated Previously: Yes, side effects of dilation explained today    What are you currently using to see?  Glasses - wears full time. Has tried CL's in past but did not like them     Distance Vision Acuity: Satisfied with vision    Near Vision Acuity: Satisfied with vision while reading and using computer with glasses    Eye Comfort: good  Do you use eye drops? : No  Occupation or Hobbies:     [employer]     Medical, surgical and family histories reviewed and updated 1/19/2023.       OBJECTIVE: See Ophthalmology exam    ASSESSMENT:    ICD-10-CM    1. Encounter for examination of eyes and vision with abnormal findings  Z01.01       2. Type 1 diabetes mellitus without retinopathy (H)  E10.9       3. Myopia of both eyes  H52.13       4. Regular astigmatism of right eye  H52.221           PLAN:    Kimberly Patterson aware  eye exam results will be sent to Emily Moyer.  Patient Instructions   Patient educated on importance of good blood sugar control.  Letter sent to primary care provider with diabetic eye exam report.     Kimberly was advised of today's exam findings.  Optional to fill new glasses prescription, minimal change  Copy of glasses Rx provided today.    Return in 1 year for eye exam, or sooner if needed.    The effects of the dilating drops last for 4- 6 hours.  You will be more sensitive to light and vision will be blurry up close.  Mydriatic sunglasses were given if needed.       Azam Herring O.D.  [facility]  [address] [place] [address]. [place]  [place]  [address]    [phone]

## 2023-01-19 NOTE — PATIENT INSTRUCTIONS
Patient educated on importance of good blood sugar control.  Letter sent to primary care provider with diabetic eye exam report.     Kimberly was advised of today's exam findings.  Optional to fill new glasses prescription, minimal change  Copy of glasses Rx provided today.    Return in 1 year for eye exam, or sooner if needed.    The effects of the dilating drops last for 4- 6 hours.  You will be more sensitive to light and vision will be blurry up close.  Mydriatic sunglasses were given if needed.       Azam Herring O.D.  [address] [place]  [address]    [phone]

## 2023-02-07 ENCOUNTER — OFFICE VISIT (OUTPATIENT)
Dept: OBGYN | Facility: CLINIC | Age: 23
End: 2023-02-07
Payer: COMMERCIAL

## 2023-02-07 VITALS
OXYGEN SATURATION: 100 % | DIASTOLIC BLOOD PRESSURE: 79 MMHG | BODY MASS INDEX: 23.32 KG/M2 | SYSTOLIC BLOOD PRESSURE: 115 MMHG | HEIGHT: 65 IN | HEART RATE: 101 BPM | WEIGHT: 140 LBS

## 2023-02-07 DIAGNOSIS — N90.89 VULVAR IRRITATION: ICD-10-CM

## 2023-02-07 DIAGNOSIS — E10.9 TYPE 1 DIABETES MELLITUS WITHOUT COMPLICATION (H): ICD-10-CM

## 2023-02-07 DIAGNOSIS — N89.8 VAGINAL DISCHARGE: Primary | ICD-10-CM

## 2023-02-07 PROCEDURE — 87210 SMEAR WET MOUNT SALINE/INK: CPT | Performed by: OBSTETRICS & GYNECOLOGY

## 2023-02-07 PROCEDURE — 99203 OFFICE O/P NEW LOW 30 MIN: CPT | Performed by: OBSTETRICS & GYNECOLOGY

## 2023-02-07 RX ORDER — METRONIDAZOLE 500 MG/1
500 TABLET ORAL 2 TIMES DAILY
Qty: 14 TABLET | Refills: 0 | Status: SHIPPED | OUTPATIENT
Start: 2023-02-07 | End: 2023-02-14

## 2023-02-07 RX ORDER — FLUCONAZOLE 150 MG/1
150 TABLET ORAL
Qty: 3 TABLET | Refills: 0 | Status: SHIPPED | OUTPATIENT
Start: 2023-02-07 | End: 2023-02-14

## 2023-02-07 RX ORDER — CLOBETASOL PROPIONATE 0.5 MG/G
OINTMENT TOPICAL AT BEDTIME
Qty: 30 G | Refills: 0 | Status: SHIPPED | OUTPATIENT
Start: 2023-02-07 | End: 2023-03-07

## 2023-02-07 NOTE — PROGRESS NOTES
GYN Progress Note     CC: vulvar irritation     HISTORY OF PRESENT ILLNESS:    Kimberly Patterson is a 23 year old who presents with vulvar irritation. She reports that this started about 2-3 months ago. At first she just noticed the vulva irritation and was using topical Vagisil but this didn't help much. As things progressed, she started to have increased thick white vaginal discharge and she did a one time vaginal suppository for yeast infections about 2 weeks ago and this improved her symptoms significantly but still having some lingering irritation. She has also had pain with intercourse for the past year and hasn't really been sexually active because of this. She reports getting a yeast infection 2-3 times per year, especially when her Type I DM is not well controlled. She is not currently following with an endocrinologist but has an appointment for May. She has adequate insulin at home but reports that her blood sugars have ranged from 150s-250s which is pretty typical for her.      OB HISTORY: G0     GYN HISTORY:  She is sexually active with one male partner, but not recently due to discomfort   Declines STI screening today   NIL pap in 7/2022   Nexplanon in place since 2020 for contraception        PAST MEDICAL HISTORY:  Past Medical History:   Diagnosis Date     Anxiety      Congenital heart disorder      Depressive disorder      Diabetes (H)      Pyelonephritis     June 2021, 2 ER visits            PAST SURGICAL HISTORY:  Past Surgical History:   Procedure Laterality Date     CARDIAC SURGERY      2017- [place]. Congenital heart condition.     EXAM UNDER ANESTHESIA RECTUM N/A 9/27/2022    Procedure: EXAM UNDER ANESTHESIA;  Surgeon: La Lund MD;  Location: [facility] OR     INCISION AND DRAINAGE, ABSCESS, RECTUM N/A 9/27/2022    Procedure: , INCISION AND DRAINAGE JAIDEN-RECTAL ABSCESS, DRAIN PLACEMENT;  Surgeon: La Lund MD;  Location: [facility] OR          CURRENT MEDICATIONS:   Current  "Outpatient Medications   Medication Sig Dispense Refill     clobetasol (TEMOVATE) 0.05 % external ointment Apply topically At Bedtime for 28 days 30 g 0     fluconazole (DIFLUCAN) 150 MG tablet Take 1 tablet (150 mg) by mouth every 3 days for 3 doses 3 tablet 0     Acetone, Urine, Test (KETONE TEST) STRP 1 each as needed (Concerning elevated ketone) 50 strip 0     alcohol swab prep pads Use to swab area of injection/connor as directed. 100 each 0     blood glucose (NO BRAND SPECIFIED) test strip Use to test blood sugar 4 times daily or as directed. AccuChek Guide me strip. 100 strip 0     blood glucose calibration (NO BRAND SPECIFIED) solution To accompany: AccuChek Guide Me Glucose meter 1 each 0     blood glucose monitoring (NO BRAND SPECIFIED) meter device kit Use to test blood sugar 4 times daily or as directed. Preferred blood glucose meter OR supplies to accompany: Accuchek Guide me glucose meter. 1 kit 0     etonogestrel (NEXPLANON) 68 MG IMPL 1 each by Subdermal route once       glucagon 1 MG kit Use as directed in case of low blood sugar resulting in seizure or unconsciousness       insulin aspart (NOVOLOG VIAL) 100 UNITS/ML vial 1 unit / 10g carbs       insulin glargine (LANTUS PEN) 100 UNIT/ML pen Inject 25 Units Subcutaneous every morning 15 mL 0     insulin pen needle (32G X 4 MM) 32G X 4 MM miscellaneous Use as directed 4-6 needles per day       insulin syringe-needle U-100 (31G X 5/16\" 0.5 ML) 31G X 5/16\" 0.5 ML miscellaneous Use 4-6 syringes per day in case of insulin pump malfuntion       multivitamin, therapeutic (THERA-VIT) TABS tablet Take 1 tablet by mouth daily       thin (NO BRAND SPECIFIED) lancets 1 each 4 times daily (before meals and nightly) Use with lanceting device. To accompany: Blood Glucose Monitor Brands: Accu Chek Guide Me meter 100 each 0            ALLERGIES:  Ketorolac         SOCIAL HISTORY:  Social History     Tobacco Use     Smoking status: Never     Smokeless tobacco: Never " "  Vaping Use     Vaping Use: Every day   Substance Use Topics     Alcohol use: Yes     Comment: occasional     Drug use: Never            FAMILY HISTORY:  Family History   Problem Relation Age of Onset     Depression Mother      Anxiety Disorder Mother      Alcoholism Mother      Multiple Sclerosis Mother      Depression Father      Anxiety Disorder Father      Alcoholism Father      No Known Problems Sister      Glaucoma No family hx of      Cancer No family hx of      Diabetes No family hx of      Hypertension No family hx of      Macular Degeneration No family hx of             REVIEW OF SYSTEMS:  See HPI        PHYSICAL EXAMINATION:  VS:/79   Pulse 101   Ht 1.651 m (5' 5\")   Wt 63.5 kg (140 lb)   LMP 02/03/2023   SpO2 100%   BMI 23.30 kg/m    Body mass index is 23.3 kg/m .    General: Patient alert and oriented, no acute distress  CV: no peripheral edema or cyanosis  Resp: normal respiratory effort and equal lung expansion  Abdomen: non-tender to light and deep palpation, no masses, organomegaly or hernia  : normal external genitalia with significant erythema, no lesions. Urethral meatus normal, urethra and bladder non-tender to palpation. Vaginal mucosa normal in appearance without lesions, increased thick white vaginal discharge.Vaginal introitus slightly narrow and patient had discomfort with bimanual and speculum exam but able to tolerate. No tenderness with palpation of the levator muscle group. Cervix appears grossly normal.  Uterus normal size and contour, non-tender. No adnexal fullness or masses present.  Ext: non-tender, no edema        Laboratory values:  Component      Latest Ref Rng & Units 2/7/2023   Trichomonas      Absent Absent   Yeast      Absent Absent   Clue cells      Absent Present (A)   WBCs/high power field      None 3+ (A)     Imaging findings:        ASSESSMENT:  Kimberly Patterson is a 23 year old who presents for vulvar irritation       PLAN:  (N89.8) Vaginal discharge  " (primary encounter diagnosis)  -Wet prep positive for BV and Rx for Metronidazole sent to pharmacy. Diflucan also prescribed for prophylaxis to prevent a yeast infection with antibiotic treatment. Given that her symptoms significantly improved with OTC yeast infection treatment she also likely had a yeast infection that may not be adequately treated.   Plan: Wet prep - Clinic Collect, fluconazole         (DIFLUCAN) 150 MG tablet, metroNIDAZOLE         (FLAGYL) 500 MG tablet    (N90.89) Vulvar irritation  -Message sent with vulvar irritants to avoid and also recommend topical clobetasol and treatment of vaginitis.   Plan: clobetasol (TEMOVATE) 0.05 % external ointment  -Follow up in 3-4 weeks to repeat exam           (E10.9) Type 1 diabetes mellitus without complication (H)  Comment: Patient does not have a PCP or endocrinologist but does have several months of insulin at home. She is scheduled to establish with endocrine in may but hasn't had care in several years and reports her blood sugars running in the 200s. Urgent endocrine referral placed.   Plan: Adult Endocrinology  Referral          Dispo: RTC in 3-4 weeks    Rhiannon Pino MD

## 2023-02-21 NOTE — PROGRESS NOTES
Outcome for 02/21/23 2:08 PM: [employer] message sent for blood sugar readings.  Claire Zuniga [facility]  Adult Endocrinology  [facility] [place]  Outcome for 02/23/23 11:19 AM: Data obtained via phone and located below. She states lost previous meter so only has 3 days of BG readings at this time from new meter. Patient with history of Medtronic sensor with pump. She states only used the pump about 6 months, then stopped a year ago when she moved, continued using the sensors without pump for a while before stopping, and pump currently lost somewhere in the house trying to find it to restart.     2/23/23   248   Before breakfast  2/22/23   339   Before breakfast      169   Before lunch      309   Before dinner  2/21/23   331   Before breakfast      199   Before dinner    Kim Pichardo [facility]  Adult Endocrinology  [facility]

## 2023-02-24 ENCOUNTER — OFFICE VISIT (OUTPATIENT)
Dept: ENDOCRINOLOGY | Facility: CLINIC | Age: 23
End: 2023-02-24
Attending: OBSTETRICS & GYNECOLOGY
Payer: COMMERCIAL

## 2023-02-24 ENCOUNTER — TELEPHONE (OUTPATIENT)
Dept: ENDOCRINOLOGY | Facility: CLINIC | Age: 23
End: 2023-02-24

## 2023-02-24 ENCOUNTER — ALLIED HEALTH/NURSE VISIT (OUTPATIENT)
Dept: EDUCATION SERVICES | Facility: CLINIC | Age: 23
End: 2023-02-24
Payer: COMMERCIAL

## 2023-02-24 VITALS
HEART RATE: 85 BPM | BODY MASS INDEX: 23.6 KG/M2 | SYSTOLIC BLOOD PRESSURE: 105 MMHG | OXYGEN SATURATION: 98 % | DIASTOLIC BLOOD PRESSURE: 69 MMHG | WEIGHT: 141.8 LBS

## 2023-02-24 DIAGNOSIS — E10.65 TYPE 1 DIABETES MELLITUS WITH HYPERGLYCEMIA (H): ICD-10-CM

## 2023-02-24 DIAGNOSIS — E10.9 TYPE 1 DIABETES MELLITUS WITHOUT COMPLICATION (H): ICD-10-CM

## 2023-02-24 DIAGNOSIS — E10.65 TYPE 1 DIABETES MELLITUS WITH HYPERGLYCEMIA (H): Primary | ICD-10-CM

## 2023-02-24 LAB
ALBUMIN SERPL-MCNC: 4.2 G/DL (ref 3.4–5)
ALP SERPL-CCNC: 138 U/L (ref 40–150)
ALT SERPL W P-5'-P-CCNC: 20 U/L (ref 0–50)
ANION GAP SERPL CALCULATED.3IONS-SCNC: 6 MMOL/L (ref 3–14)
AST SERPL W P-5'-P-CCNC: 8 U/L (ref 0–45)
BILIRUB SERPL-MCNC: 0.5 MG/DL (ref 0.2–1.3)
BUN SERPL-MCNC: 15 MG/DL (ref 7–30)
CALCIUM SERPL-MCNC: 9.5 MG/DL (ref 8.5–10.1)
CHLORIDE BLD-SCNC: 98 MMOL/L (ref 94–109)
CHOLEST SERPL-MCNC: 216 MG/DL
CO2 SERPL-SCNC: 28 MMOL/L (ref 20–32)
CREAT SERPL-MCNC: 0.54 MG/DL (ref 0.52–1.04)
CREAT UR-MCNC: 22 MG/DL
FASTING STATUS PATIENT QL REPORTED: NO
GFR SERPL CREATININE-BSD FRML MDRD: >90 ML/MIN/1.73M2
GLUCOSE BLD-MCNC: 546 MG/DL (ref 70–99)
HBA1C MFR BLD: 12.7 % (ref 4.3–?)
HDLC SERPL-MCNC: 57 MG/DL
LDLC SERPL CALC-MCNC: 141 MG/DL
MICROALBUMIN UR-MCNC: <5 MG/L
MICROALBUMIN/CREAT UR: NORMAL MG/G{CREAT}
NONHDLC SERPL-MCNC: 159 MG/DL
POTASSIUM BLD-SCNC: 4.4 MMOL/L (ref 3.4–5.3)
PROT SERPL-MCNC: 8.4 G/DL (ref 6.8–8.8)
SODIUM SERPL-SCNC: 132 MMOL/L (ref 133–144)
TRIGL SERPL-MCNC: 92 MG/DL
TSH SERPL DL<=0.005 MIU/L-ACNC: 2.03 MU/L (ref 0.4–4)

## 2023-02-24 PROCEDURE — 82570 ASSAY OF URINE CREATININE: CPT | Performed by: PHYSICIAN ASSISTANT

## 2023-02-24 PROCEDURE — 99214 OFFICE O/P EST MOD 30 MIN: CPT | Performed by: PHYSICIAN ASSISTANT

## 2023-02-24 PROCEDURE — 84443 ASSAY THYROID STIM HORMONE: CPT | Performed by: PHYSICIAN ASSISTANT

## 2023-02-24 PROCEDURE — 80053 COMPREHEN METABOLIC PANEL: CPT | Performed by: PHYSICIAN ASSISTANT

## 2023-02-24 PROCEDURE — 82043 UR ALBUMIN QUANTITATIVE: CPT | Performed by: PHYSICIAN ASSISTANT

## 2023-02-24 PROCEDURE — 36415 COLL VENOUS BLD VENIPUNCTURE: CPT | Performed by: PHYSICIAN ASSISTANT

## 2023-02-24 PROCEDURE — 83036 HEMOGLOBIN GLYCOSYLATED A1C: CPT | Performed by: PHYSICIAN ASSISTANT

## 2023-02-24 PROCEDURE — 99207 PR NO BILLABLE SERVICE THIS VISIT: CPT

## 2023-02-24 PROCEDURE — 80061 LIPID PANEL: CPT | Performed by: PHYSICIAN ASSISTANT

## 2023-02-24 RX ORDER — BLOOD-GLUCOSE SENSOR
1 EACH MISCELLANEOUS
Qty: 2 EACH | Refills: 3 | Status: SHIPPED | OUTPATIENT
Start: 2023-02-24 | End: 2023-03-31

## 2023-02-24 RX ORDER — BLOOD-GLUCOSE SENSOR
1 EACH MISCELLANEOUS
Qty: 2 EACH | Refills: 6 | Status: SHIPPED | OUTPATIENT
Start: 2023-02-24 | End: 2024-02-09

## 2023-02-24 NOTE — LETTER
2/24/2023         RE: Kimberly Patterson  [address]  [place] [address]        Dear Colleague,    Thank you for referring your patient, Kimberly Patterson, to the [facility]. Please see a copy of my visit note below.    Outcome for 02/21/23 2:08 PM: [employer] message sent for blood sugar readings.  Claire Zuniga [facility]  Adult Endocrinology  [facility]  Outcome for 02/23/23 11:19 AM: Data obtained via phone and located below. She states lost previous meter so only has 3 days of BG readings at this time from new meter. Patient with history of Medtronic sensor with pump. She states only used the pump about 6 months, then stopped a year ago when she moved, continued using the sensors without pump for a while before stopping, and pump currently lost somewhere in the house trying to find it to restart.     2/23/23   248   Before breakfast  2/22/23   339   Before breakfast      169   Before lunch      309   Before dinner  2/21/23   331   Before breakfast      199   Before dinner    Kim Pichardo [facility]  Adult Endocrinology  [facility]        Assessment/Plan :   1. Type 1 DM with hyperglycemia. Kimberly's A1C is too high. It has improved since her recent ER visit but it remains very elevated. We discussed the connection between hyperglycemia and long term complications. She really wants to get her blood sugars back under control. She is also interested in restarting insulin pump therapy.    We discussed options for ongoing treatment. She would be a great pump candidate but we need to get her blood sugars under tighter control. She is currently using fingerstick testing and I think she would benefit greatly from CGMS sensor therapy. We will get her started with the FreeStyle Kathe 3 today and I will send in a prescription to her local pharmacy.    We will also start by adjusting her Lantus dose. I am recommending that she increase by 1 unit every day, until her morning  blood sugar is under 150 mg/dl. She will continue with her current Novolog sliding scale.    I would also like her to establish care with our CDE team. I will place a referral today. She can discuss insulin pump options further, with them.    Lastly, she is long overdue for routine laboratory testing. A lab order was placed today.    We will follow-up in 1 month.      I have independently reviewed and interpreted labs, imaging as indicated.      Chief complaint:  Kimberly is a 23 year old female seen in consultation at the request of   I have reviewed Care Everywhere including [facility], [facility], [facility], [facility], [facility], [facility] , [facility], [facility] lab reports, imaging reports and provider notes as indicated.      HISTORY OF PRESENT ILLNESS  Kimberly was diagnosed with Type 1 DM when she was 9 yrs old. Her mother took care of her blood sugars and made sure that she stayed on track. She has struggled over the last few years to manage her blood sugars, on her own. She was previously using the Medtronic 770G insulin pump and Guardian sensor. She struggled to get the sensors, consistently, so she stopped using the insulin pump. She is still under warranty for the Medtronic pump but she is not sure where it is. She would like to start using an insulin pump again, but she is not sure how to go forward with the process.    She has been using MDI therapy, since she stopped using the insulin pump. She currently takes 25 units of Lantus in the morning. She uses Novolog before meals based on a sliding scale. She takes 1 unit for every 10 grams of carbs with a correction of 1 unit for every 50 over 150 mg/dl. Her morning blood sugars are usually over 200 mg/dl. She does occasionally experience low blood sugars. This usually occurs about 2 hrs after she has eaten. She has not experienced any severe hypoglycemia and/or hyperglycemia. A review of her chart indicates that she was diagnosed with ketoacidosis  in Sept of 2022, when she went to the ER with a perirectal abscess.     She has followed up with yearly eye exams. She has never been diagnosed with retinopathy. She has never taken any blood pressure medications and she does not think she has ever had any protein in her urine. She is overdue for a urinary albumin. She also denies any problems with numbness or tingling in her feet.    Endocrine relevant labs are as follows:   Latest Reference Range & Units 02/24/23 08:55   Hemoglobin A1C POCT 4.3 - <5.7 % 12.7 !   !: Data is abnormal   Latest Reference Range & Units 09/25/22 14:32   Hemoglobin A1C <5.7 % 14.2 (H)   (H): Data is abnormally high    REVIEW OF SYSTEMS    Endocrine: positive for diabetes  Skin: negative  Eyes: positive for glasses, negative for, visual blurring  Ears/Nose/Throat: negative  Respiratory: No shortness of breath, dyspnea on exertion, cough, or hemoptysis  Cardiovascular: negative for, irregular heart beat and chest pain  Gastrointestinal: negative for, nausea, vomiting, constipation and diarrhea  Genitourinary: negative for, nocturia, dysuria, frequency and urgency  Musculoskeletal: negative for, muscular weakness and nocturnal cramping  Neurologic: negative for and numbness or tingling of feet  Psychiatric: negative  Hematologic/Lymphatic/Immunologic: negative    Past Medical History  Past Medical History:   Diagnosis Date     Anxiety      Congenital heart disorder      Depressive disorder      Diabetes (H)      Pyelonephritis     June 2021, 2 ER visits       Medications  Current Outpatient Medications   Medication Sig Dispense Refill     Acetone, Urine, Test (KETONE TEST) STRP 1 each as needed (Concerning elevated ketone) 50 strip 0     alcohol swab prep pads Use to swab area of injection/connor as directed. 100 each 0     blood glucose (NO BRAND SPECIFIED) test strip Use to test blood sugar 4 times daily or as directed. AccuChek Guide me strip. 100 strip 0     blood glucose calibration (NO  BRAND SPECIFIED) solution To accompany: AccuChek Guide Me Glucose meter 1 each 0     blood glucose monitoring (NO BRAND SPECIFIED) meter device kit Use to test blood sugar 4 times daily or as directed. Preferred blood glucose meter OR supplies to accompany: Accuchek Guide me glucose meter. 1 kit 0     clobetasol (TEMOVATE) 0.05 % external ointment Apply topically At Bedtime for 28 days 30 g 0     Continuous Blood Gluc Sensor (FREESTYLE RTISHA 3 SENSOR) MISC 1 each every 14 days 2 each 3     etonogestrel (NEXPLANON) 68 MG IMPL 1 each by Subdermal route once       insulin aspart (NOVOLOG PEN) 100 UNIT/ML pen 1 unit per 10 carbs       insulin glargine (LANTUS PEN) 100 UNIT/ML pen Inject 25 Units Subcutaneous every morning 15 mL 0     insulin pen needle (32G X 4 MM) 32G X 4 MM miscellaneous Use as directed 4-6 needles per day       multivitamin, therapeutic (THERA-VIT) TABS tablet Take 1 tablet by mouth daily       thin (NO BRAND SPECIFIED) lancets 1 each 4 times daily (before meals and nightly) Use with lanceting device. To accompany: Blood Glucose Monitor Brands: Accu Chek Guide Me meter 100 each 0     glucagon 1 MG kit Use as directed in case of low blood sugar resulting in seizure or unconsciousness         Allergies  Allergies   Allergen Reactions     Ketorolac Difficulty breathing         Family History  family history includes Alcoholism in her father and mother; Anxiety Disorder in her father and mother; Depression in her father and mother; Multiple Sclerosis in her mother; No Known Problems in her sister.    Social History  Social History     Tobacco Use     Smoking status: Never     Smokeless tobacco: Never   Vaping Use     Vaping Use: Every day   Substance Use Topics     Alcohol use: Yes     Comment: occasional     Drug use: Never       Physical Exam  /69 (BP Location: Left arm, Patient Position: Sitting, Cuff Size: Adult Regular)   Pulse 85   Wt 64.3 kg (141 lb 12.8 oz)   LMP 02/03/2023   SpO2 98%    BMI 23.60 kg/m    Body mass index is 23.6 kg/m .  GENERAL :  In no apparent distress  SKIN: Normal color, normal temperature, texture.  No hirsutism, alopecia or purple striae.     EYES: PERRL, EOMI, No scleral icterus,  No proptosis, conjunctival redness, stare, retraction. Pt is wearing glasses  NECK: No visible masses. No palpable adenopathy, or masses. No carotid bruits.   THYROID:  Normal, nontender, smooth / firm texture,  no nodules, no Bruit   RESP: Lungs clear to auscultation bilaterally  CARDIAC: Regular rate and rhythm, normal S1 S2, without murmurs, rubs or gallops    NEURO: awake, alert, responds appropriately to questions.  Cranial nerves intact.   Moves all extremities; Gait normal.  No tremor of the outstretched hand.    EXTREMITIES: No clubbing, cyanosis or edema. Strong pedal pulses bilaterally.    DATA REVIEW  Please see attached glucose logs          Again, thank you for allowing me to participate in the care of your patient.        Sincerely,        Nanette Sykes PA-C

## 2023-02-24 NOTE — PROGRESS NOTES
Assessment/Plan :   1. Type 1 DM with hyperglycemia. Kimberly's A1C is too high. It has improved since her recent ER visit but it remains very elevated. We discussed the connection between hyperglycemia and long term complications. She really wants to get her blood sugars back under control. She is also interested in restarting insulin pump therapy.    We discussed options for ongoing treatment. She would be a great pump candidate but we need to get her blood sugars under tighter control. She is currently using fingerstick testing and I think she would benefit greatly from CGMS sensor therapy. We will get her started with the FreeStyle Kathe 3 today and I will send in a prescription to her local pharmacy.    We will also start by adjusting her Lantus dose. I am recommending that she increase by 1 unit every day, until her morning blood sugar is under 150 mg/dl. She will continue with her current Novolog sliding scale.    I would also like her to establish care with our CDE team. I will place a referral today. She can discuss insulin pump options further, with them.    Lastly, she is long overdue for routine laboratory testing. A lab order was placed today.    We will follow-up in 1 month.      I have independently reviewed and interpreted labs, imaging as indicated.      Chief complaint:  Kimberly is a 23 year old female seen in consultation at the request of   I have reviewed Care Everywhere including [facility], [facility], [facility], [facility] , [facility], [place] lab reports, imaging reports and provider notes as indicated.      HISTORY OF PRESENT ILLNESS  Kimberly was diagnosed with Type 1 DM when she was 9 yrs old. Her mother took care of her blood sugars and made sure that she stayed on track. She has struggled over the last few years to manage her blood sugars, on her own. She was previously using the Medtronic 770G insulin pump and Guardian sensor. She struggled to get the  sensors, consistently, so she stopped using the insulin pump. She is still under warranty for the Medtronic pump but she is not sure where it is. She would like to start using an insulin pump again, but she is not sure how to go forward with the process.    She has been using MDI therapy, since she stopped using the insulin pump. She currently takes 25 units of Lantus in the morning. She uses Novolog before meals based on a sliding scale. She takes 1 unit for every 10 grams of carbs with a correction of 1 unit for every 50 over 150 mg/dl. Her morning blood sugars are usually over 200 mg/dl. She does occasionally experience low blood sugars. This usually occurs about 2 hrs after she has eaten. She has not experienced any severe hypoglycemia and/or hyperglycemia. A review of her chart indicates that she was diagnosed with ketoacidosis in Sept of 2022, when she went to the ER with a perirectal abscess.     She has followed up with yearly eye exams. She has never been diagnosed with retinopathy. She has never taken any blood pressure medications and she does not think she has ever had any protein in her urine. She is overdue for a urinary albumin. She also denies any problems with numbness or tingling in her feet.    Endocrine relevant labs are as follows:   Latest Reference Range & Units 02/24/23 08:55   Hemoglobin A1C POCT 4.3 - <5.7 % 12.7 !   !: Data is abnormal   Latest Reference Range & Units 09/25/22 14:32   Hemoglobin A1C <5.7 % 14.2 (H)   (H): Data is abnormally high    REVIEW OF SYSTEMS    Endocrine: positive for diabetes  Skin: negative  Eyes: positive for glasses, negative for, visual blurring  Ears/Nose/Throat: negative  Respiratory: No shortness of breath, dyspnea on exertion, cough, or hemoptysis  Cardiovascular: negative for, irregular heart beat and chest pain  Gastrointestinal: negative for, nausea, vomiting, constipation and diarrhea  Genitourinary: negative for, nocturia, dysuria, frequency and  urgency  Musculoskeletal: negative for, muscular weakness and nocturnal cramping  Neurologic: negative for and numbness or tingling of feet  Psychiatric: negative  Hematologic/Lymphatic/Immunologic: negative    Past Medical History  Past Medical History:   Diagnosis Date     Anxiety      Congenital heart disorder      Depressive disorder      Diabetes (H)      Pyelonephritis     June 2021, 2 ER visits       Medications  Current Outpatient Medications   Medication Sig Dispense Refill     Acetone, Urine, Test (KETONE TEST) STRP 1 each as needed (Concerning elevated ketone) 50 strip 0     alcohol swab prep pads Use to swab area of injection/connor as directed. 100 each 0     blood glucose (NO BRAND SPECIFIED) test strip Use to test blood sugar 4 times daily or as directed. AccuChek Guide me strip. 100 strip 0     blood glucose calibration (NO BRAND SPECIFIED) solution To accompany: AccuChek Guide Me Glucose meter 1 each 0     blood glucose monitoring (NO BRAND SPECIFIED) meter device kit Use to test blood sugar 4 times daily or as directed. Preferred blood glucose meter OR supplies to accompany: Accuchek Guide me glucose meter. 1 kit 0     clobetasol (TEMOVATE) 0.05 % external ointment Apply topically At Bedtime for 28 days 30 g 0     Continuous Blood Gluc Sensor (FREESTYLE TRISHA 3 SENSOR) MISC 1 each every 14 days 2 each 3     etonogestrel (NEXPLANON) 68 MG IMPL 1 each by Subdermal route once       insulin aspart (NOVOLOG PEN) 100 UNIT/ML pen 1 unit per 10 carbs       insulin glargine (LANTUS PEN) 100 UNIT/ML pen Inject 25 Units Subcutaneous every morning 15 mL 0     insulin pen needle (32G X 4 MM) 32G X 4 MM miscellaneous Use as directed 4-6 needles per day       multivitamin, therapeutic (THERA-VIT) TABS tablet Take 1 tablet by mouth daily       thin (NO BRAND SPECIFIED) lancets 1 each 4 times daily (before meals and nightly) Use with lanceting device. To accompany: Blood Glucose Monitor Brands: Accu Chek Guide Me  meter 100 each 0     glucagon 1 MG kit Use as directed in case of low blood sugar resulting in seizure or unconsciousness         Allergies  Allergies   Allergen Reactions     Ketorolac Difficulty breathing         Family History  family history includes Alcoholism in her father and mother; Anxiety Disorder in her father and mother; Depression in her father and mother; Multiple Sclerosis in her mother; No Known Problems in her sister.    Social History  Social History     Tobacco Use     Smoking status: Never     Smokeless tobacco: Never   Vaping Use     Vaping Use: Every day   Substance Use Topics     Alcohol use: Yes     Comment: occasional     Drug use: Never       Physical Exam  /69 (BP Location: Left arm, Patient Position: Sitting, Cuff Size: Adult Regular)   Pulse 85   Wt 64.3 kg (141 lb 12.8 oz)   LMP 02/03/2023   SpO2 98%   BMI 23.60 kg/m    Body mass index is 23.6 kg/m .  GENERAL :  In no apparent distress  SKIN: Normal color, normal temperature, texture.  No hirsutism, alopecia or purple striae.     EYES: PERRL, EOMI, No scleral icterus,  No proptosis, conjunctival redness, stare, retraction. Pt is wearing glasses  NECK: No visible masses. No palpable adenopathy, or masses. No carotid bruits.   THYROID:  Normal, nontender, smooth / firm texture,  no nodules, no Bruit   RESP: Lungs clear to auscultation bilaterally  CARDIAC: Regular rate and rhythm, normal S1 S2, without murmurs, rubs or gallops    NEURO: awake, alert, responds appropriately to questions.  Cranial nerves intact.   Moves all extremities; Gait normal.  No tremor of the outstretched hand.    EXTREMITIES: No clubbing, cyanosis or edema. Strong pedal pulses bilaterally.    DATA REVIEW  Please see attached glucose logs

## 2023-02-24 NOTE — TELEPHONE ENCOUNTER
Spoke with provider and informed of results.  Patient called and I relayed lab result to her.  Patient states that she feels totally fine.  Patient informed that she should go to the ER if she starts feeling off.  Patient is going to administer insulin right now. Patient to call if any further needs.     Earnestine John on 2/24/2023 at 10:55 AM

## 2023-02-24 NOTE — PATIENT INSTRUCTIONS
It was nice meeting with you today.    A lab order has been sent, to be drawn today. I will contact you with the results through [employer].    Work on increasing the Lantus dose by 1 unit until your morning blood sugar is around 150 mg/dl. I would like to see you back at the clinic in 1 month.

## 2023-02-24 NOTE — PROGRESS NOTES
Short visit for Kathe 3 start at the request of Nanette Sykes PA-C while in clinic. Previously on Medtronic 770/Guardian 3 sensor. Discussed with patient: purpose of sensor, variability between blood glucose and sensor values, meaning of trending arrows, cannot wear sensor when having MRI or CT scan, informed to not go through full body scanner at airport, and sensor is waterproof. Reminded that if sensor reading does not match symptoms, to check with fingerstick. Low alert OFF. High alert OFF. Informed urgent low 55 or less, cannot be turned off. There is a 1 hour warm up period. Data is least accurate the first 12-24 hours. Patient demonstrated correct technique. Sensor placed on right arm without difficulty. Prescription completed to [facility].    Lot [id number]    Follow up 3/20 with Mary  Follow up 3/31with Nanette Hansen RN Diabetes Educator  Diabetes Education Department  [facility]  [phone]

## 2023-02-24 NOTE — NURSING NOTE
Kimberly Patterson's goals for this visit include:   Chief Complaint   Patient presents with     Consult     Diabetes     She requests these members of her care team be copied on today's visit information: Yes    PCP: Emily Moyer    Referring Provider:  Rhiannon Pino MD  [address]  [place] [address]    /69 (BP Location: Left arm, Patient Position: Sitting, Cuff Size: Adult Regular)   Pulse 85   Wt 64.3 kg (141 lb 12.8 oz)   LMP 02/03/2023   SpO2 98%   BMI 23.60 kg/m      Do you need any medication refills at today's visit? Yes

## 2023-03-01 ENCOUNTER — OFFICE VISIT (OUTPATIENT)
Dept: OBGYN | Facility: CLINIC | Age: 23
End: 2023-03-01
Payer: COMMERCIAL

## 2023-03-01 ENCOUNTER — MYC MEDICAL ADVICE (OUTPATIENT)
Dept: ENDOCRINOLOGY | Facility: CLINIC | Age: 23
End: 2023-03-01

## 2023-03-01 VITALS
HEIGHT: 65 IN | OXYGEN SATURATION: 100 % | SYSTOLIC BLOOD PRESSURE: 115 MMHG | HEART RATE: 90 BPM | WEIGHT: 146 LBS | DIASTOLIC BLOOD PRESSURE: 77 MMHG | BODY MASS INDEX: 24.32 KG/M2

## 2023-03-01 DIAGNOSIS — N94.2 VAGINISMUS: ICD-10-CM

## 2023-03-01 DIAGNOSIS — N90.89 VULVAR IRRITATION: Primary | ICD-10-CM

## 2023-03-01 PROCEDURE — 99213 OFFICE O/P EST LOW 20 MIN: CPT | Performed by: OBSTETRICS & GYNECOLOGY

## 2023-03-01 NOTE — PROGRESS NOTES
"GYN Progress Note     CC: F/U vulvar irritation     HPI: Kimberly Patterson is a 24 YO G0 who presents for follow up due to vulvar irritation. She was treated for BV after her last visit as well as a presumed yeast infection despite negative wet prep for yeast. She had also been using topical Vagisil so we discussed avoiding vulvar irritants and she was given topical clobetasol to use for atopic dermatitis. She reports that her vaginal/vulvar symptoms totally resolved after 2-3 days of the Metronidazole and Diflucan and she never ended up needing to use the Clobetasol. She also reported dyspareunia at her last visit which started suddenly after one painful sexual encounter and now she is unable to have penetrative intercourse due to pain. She feels like she is tightening the muscles of the pelvic floor prior to penetration and unable to relax them. She reports trying again last night and having to stop due to pain despite improvement in her vulvar symptoms. She has not tried PT and would be open to that.     O: Vital signs:      BP: 115/77 Pulse: 90     SpO2: 100 %     Height: 165.1 cm (5' 5\") Weight: 66.2 kg (146 lb)  Estimated body mass index is 24.3 kg/m  as calculated from the following:    Height as of this encounter: 1.651 m (5' 5\").    Weight as of this encounter: 66.2 kg (146 lb).    General: Patient alert and oriented, no acute distress  CV: no peripheral edema or cyanosis  Resp: normal respiratory effort and equal lung expansion  Abdomen/: deferred   Ext: non-tender, no edema    Assessment and plan:   Kimberly Patterson is a 24 YO G0 who presents for follow up for vulvar irritation   (N90.89) Vulvar irritation  (primary encounter diagnosis)  -resolved, continue to avoid vulvar irritations. Patient also working with endocrine to gain better control over her diabetes which will likely result in fewer yeast infections    (N94.2) Vaginismus  Comment: discussed pathophysiology of vaginismus and treatment options, she " would like to try pelvic floor PT  Plan: Physical Therapy Referral          Dispo: RTC as needed   Rhiannon Pino MD

## 2023-03-06 NOTE — TELEPHONE ENCOUNTER
Writer called patient and she will look at the form tonight and send it back at her earliest via mychart attachment.    Kim Pichardo CMA  Adult Endocrinology  [facility]

## 2023-03-08 NOTE — TELEPHONE ENCOUNTER
Message sent to follow up on DMV form. Clinic has not received it back.    Claire Zuniga, [facility]  Adult Endocrinology  [facility], [place]

## 2023-03-16 NOTE — TELEPHONE ENCOUNTER
Patient has not replied or completed patient section of form. Form placed in provider folder for upcoming appointment 3/31/23 with Nanette Sykes.    Kim Pichardo CMA  Adult Endocrinology  [facility]

## 2023-03-20 ENCOUNTER — ALLIED HEALTH/NURSE VISIT (OUTPATIENT)
Dept: EDUCATION SERVICES | Facility: CLINIC | Age: 23
End: 2023-03-20
Payer: COMMERCIAL

## 2023-03-20 DIAGNOSIS — E10.65 TYPE 1 DIABETES MELLITUS WITH HYPERGLYCEMIA (H): Primary | ICD-10-CM

## 2023-03-20 PROCEDURE — G0108 DIAB MANAGE TRN  PER INDIV: HCPCS

## 2023-03-20 NOTE — PROGRESS NOTES
Diabetes Self-Management Education & Support    SUBJECTIVE:  Kimberly Patterson presents today for education related to planning for an insulin pump  She is accompanied by self  Patient concerns: is concerned about   Year or age diagnosed:  Age 9  Past Diabetes Education: Yes    Socio/Economic History:  Support system: spouse/significant other  Living Situation: lives with spouse/significant other  Occupation: Works in     MONITORING:  BG meter: Kathe 3   BG results:       RAPID-ACTING INSULIN CALCULATIONS:  Does patient currently count carbs? yes, counts in grams  Is instruction indicated? NO  How is insulin determined for correction: uses correction factor or follows sliding scale    PROBLEM SOLVING:  Patient is at risk of hypoglycemia?: Frequency is one to two times per week, symptoms: confused, tired; feels lows at 80, treats with granola bar, crackers  Hospitalizations for hyper or hypoglycemia: Yes (Please explain): DKA  What is considered a high blood sugar? over 350    How is high BG treated: Insulin and water  Does Patient test for ketones? No  At what level of blood glucose are ketones tested? over 300  If ketones are present, what action is taken? Insulin    Physical Activity:    moderate regular exercise program which includes cardio, gym 3 times per week for 45 minutes  What accomodations are made for exercising: waits until BG above 130 or brings snack with    Diabetes knowledge and skills assessment:   Patient is knowledgeable in diabetes management concepts related to: Being Active, Monitoring, Taking Medication, Problem Solving, Reducing Risks, Healthy Coping and Insulin Pump Concepts Balancing glucose and insulin  Carbohydrate counting  Calculating boluses  Problem solving with insulin pump therapy (BG monitoring; hypoglycemia signs/symptoms, treatment (glucagon) and prevention; hyperglycemia signs/symptoms, treatment and prevention; ketones, DKA signs/symptoms and prevention)  Hands on practice  with basic pump button use    Patient needs further education on the following diabetes management concepts: Healthy Eating    Based on learning assessment above, most appropriate setting for further diabetes education would be: Individual setting.    EDUCATION PROVIDED TODAY  Explained the way an insulin pump works, in terms that described the function of a basal rate and a bolus calculator.    Demonstrated the operation of the three main pumps on the market right now:  Omnipod 5 and Tandem CIQ.    Explained the unique features of each pump.      Explained that having a pump does not take the place of checking her blood glucoses, counting carbs or remembering to push the button to deliver the bolus.   Described how the insulin delivers insulin through a cannula inserted under the skin and attached to the pump itself, or in the case of Omnipod, controlled wirelessly via blue tooth pairing with the Personal Diabetes Manager (PDM).     Reviewed some of the increased risks associated with using a pump, i.e. DKA, especially if not checking BG at least 3-4 times daily or using a continuous glucose monitor (CGM).    Explained in general terms the costs associated with using an insulin pump, including the disposables and insulin.  Explained the process for obtaining a pump:  Approval by diabetes management team, application to the pump company, approval by insurance company, and necessary pre-training and post-pump follow-up.    Explained the need to follow up on a regular basis with diabetes care team in order to continue to have insulin pumping supplies approved by insurance.     Assessment:  Patient very happy with Kathe 3. Kathe data: TIR 55% with 2% lows after breakfast and lunch, will weaken ICR to 1:15, occasional lows overnight; reduce Lantus to 28 units. Does over treat lows, discussed 15:15 rule. Current regimen: Lantus 29 units. Novolog ICR 1:12, correction 1 unit per 50>150. Novolog TDD 20-35 units. Sometimes  takes 5 minutes after eating 2-3 times per week, typically doesn't miss doses. Lost Medtronic 770 pump; warranty expires 6/17/2025. Patient hesitant to switch to Dexcom G6. Likely will work with Medtronic and resume pump therapy until warranty is up. Interested in Cequr insulin patch; discussed and showed video application.     See Patient Instructions-via My Chart    PLAN:  *Change insulin to carb ration to 1:15 at breakfast and lunch, continue 1:12 for dinner  *Take Novolog 10 minutes prior to eating  *Reduce Lantus 28 units daily  *Use correction 1 unit per 50 >200 at bedtime  *Let me know if want to pursue Cequr  *Medtroni warranty expires 6/17/2025. Let me know if you are going to restart to discuss pump settings  15:15    Follow-up:   *3/31 with Nanette Sykes PA-C    Time Spent: 60 minutes  Encounter Type: Individual     Any diabetes medication dose changes were made via the CDE Protocol Collaborative Practice Agreement with referring provider.  A copy of this encounter was provided to patient's referring provider.

## 2023-03-20 NOTE — PATIENT INSTRUCTIONS
PLAN:  *Change insulin to carb ration to 1:15 at breakfast and lunch, continue 1:12 for dinner  *Take Novolog 10 minutes prior to eating  *Reduce Lantus 28 units daily  *Use correction 1 unit per 50 >200 at bedtime  *Let me know if want to pursue Cequr  *Medtroni warranty expires 6/17/2025. Let me know if you are going to restart to discuss pump settings  *15:15 rule    Follow-up:   *3/31 with Nanette Sykes PA-C

## 2023-03-29 NOTE — PROGRESS NOTES
Outcome for 03/29/23 1:36 PM: Data uploaded on [employer], reports printed  Claire Zuniga CMA  Adult Endocrinology  [facility]

## 2023-03-31 ENCOUNTER — OFFICE VISIT (OUTPATIENT)
Dept: ENDOCRINOLOGY | Facility: CLINIC | Age: 23
End: 2023-03-31
Payer: COMMERCIAL

## 2023-03-31 VITALS
HEART RATE: 108 BPM | BODY MASS INDEX: 24.08 KG/M2 | SYSTOLIC BLOOD PRESSURE: 106 MMHG | WEIGHT: 144.7 LBS | DIASTOLIC BLOOD PRESSURE: 74 MMHG | OXYGEN SATURATION: 98 %

## 2023-03-31 DIAGNOSIS — E10.65 TYPE 1 DIABETES MELLITUS WITH HYPERGLYCEMIA (H): ICD-10-CM

## 2023-03-31 PROCEDURE — 99213 OFFICE O/P EST LOW 20 MIN: CPT | Performed by: PHYSICIAN ASSISTANT

## 2023-03-31 NOTE — NURSING NOTE
Kimberly Patterson's goals for this visit include:   Chief Complaint   Patient presents with     Diabetes     She requests these members of her care team be copied on today's visit information: No    PCP: Emily Moyer    Referring Provider:  No referring provider defined for this encounter.    /74 (BP Location: Left arm, Patient Position: Sitting, Cuff Size: Adult Regular)   Pulse 108   Wt 65.6 kg (144 lb 11.2 oz)   LMP 02/03/2023   SpO2 98%   BMI 24.08 kg/m      Do you need any medication refills at today's visit? Yes

## 2023-03-31 NOTE — LETTER
3/31/2023         RE: Kimberly Patterson  [address]  [facility] [address]        Dear Colleague,    Thank you for referring your patient, Kimberly Patterson, to the [facility]. Please see a copy of my visit note below.    Outcome for 03/29/23 1:36 PM: Data uploaded on [employer], reports printed  Claire Zuniga CMA  Adult Endocrinology  [facility] [place]      Assessment/Plan :   1. Type 1 DM, uncontrolled. Kimberly is doing much better. We reviewed the role of both Lantus and Novolog in managing blood sugars. She has noticed an improvement in hypoglycemia, since decreasing from 29 to 28 units, however, her post-lunch numbers seem to be higher. We will increase her insulin to carb ratio to 1:13 at lunch. She will stick with 1:12 at dinner and 1:15 with breakfast. We also reviewed what to do if her blood sugar starts to drop.     She will stick with the FreeStyle Kathe 3 and we will follow-up in 1 month.    I also completed DM paperwork approving her for driving privileges.     I have independently reviewed and interpreted labs, imaging as indicated.      Chief complaint:  Kimberly is a 23 year old female who returns for follow-up regarding Type 1 DM.    I have reviewed Care Everywhere including [facility], [facility], [facility], [facility] , [facility], [place] lab reports, imaging reports and provider notes as indicated.      HISTORY OF PRESENT ILLNESS  Kimberly has had diabetes since she was 9 yrs old. She has been struggling to get her blood sugars stabilized since she moved out on her own. She has used the Medtronic insulin pump and Guardian sensor, in the past. She felt like the sensors were constantly malfunctioning, so she stopped using the pump all together. She is still under warranty until 2025 but she is not sure where the insulin pump is, currently.     Most recently, she has been trying to manage her diabetes with MDI insulin therapy. She first saw me  on Feb 24th. She was taking 25 units of Lantus in the morning along with Novolog, sliding scale. She was struggling with large glucose fluctuations. She was previously monitoring her blood sugars with fingerstick testing and she felt like her numbers were always low or high.     We discussed options and I would like her to, eventually, transition back to insulin pump therapy. However, we decided to focus on getting her MDI dosing stabilized. I was also concerned about her risk for severe hyperglycemia and/or hypoglycemia. We increased her Lantus to 29 units daily and we started her on the Kathe 3 sensor.    Her blood sugars are improving. She was experiencing consistent hypoglycemia early morning and in the afternoon. She had a follow-up visit with our CDE team and they decreased her Lantus dose to 28 units. They also adjusted her Novolog insulin to carb ratio to 1:12 at dinner and 1:15 at breakfast and lunch.    Endocrine relevant labs are as follows:    Latest Reference Range & Units 02/24/23 08:55   Hemoglobin A1C POCT 4.3 - <5.7 % 12.7 !       REVIEW OF SYSTEMS    Endocrine: positive for diabetes  Skin: negative  Eyes: negative for, visual blurring  Ears/Nose/Throat: positive for nasal congestion, COVID exposure  Respiratory: No shortness of breath, dyspnea on exertion, cough, or hemoptysis  Cardiovascular: negative for, chest pain and exercise intolerance  Gastrointestinal: negative for, nausea, vomiting, constipation and diarrhea  Genitourinary: negative for, nocturia, dysuria, frequency and urgency  Musculoskeletal: negative  Neurologic: negative for and numbness or tingling of feet  Psychiatric: negative  Hematologic/Lymphatic/Immunologic: negative    Past Medical History  Past Medical History:   Diagnosis Date     Anxiety      Congenital heart disorder      Depressive disorder      Diabetes (H)      Pyelonephritis     June 2021, 2 ER visits       Medications  Current Outpatient Medications   Medication Sig  Dispense Refill     Acetone, Urine, Test (KETONE TEST) STRP 1 each as needed (Concerning elevated ketone) 50 strip 0     alcohol swab prep pads Use to swab area of injection/connor as directed. 100 each 0     blood glucose (NO BRAND SPECIFIED) test strip Use to test blood sugar 4 times daily or as directed. AccuChek Guide me strip. 100 strip 0     blood glucose calibration (NO BRAND SPECIFIED) solution To accompany: AccuChek Guide Me Glucose meter 1 each 0     blood glucose monitoring (NO BRAND SPECIFIED) meter device kit Use to test blood sugar 4 times daily or as directed. Preferred blood glucose meter OR supplies to accompany: Accuchek Guide me glucose meter. 1 kit 0     Continuous Blood Gluc Sensor (FREESTYLE TRISHA 3 SENSOR) MISC 1 each every 14 days 2 each 6     insulin aspart (NOVOLOG PEN) 100 UNIT/ML pen 1 unit per 10 carbs       insulin glargine (LANTUS PEN) 100 UNIT/ML pen Inject 25 Units Subcutaneous every morning 15 mL 0     insulin pen needle (32G X 4 MM) 32G X 4 MM miscellaneous Use as directed 4-6 needles per day       multivitamin, therapeutic (THERA-VIT) TABS tablet Take 1 tablet by mouth daily       thin (NO BRAND SPECIFIED) lancets 1 each 4 times daily (before meals and nightly) Use with lanceting device. To accompany: Blood Glucose Monitor Brands: Accu Chek Guide Me meter 100 each 0     etonogestrel (NEXPLANON) 68 MG IMPL 1 each by Subdermal route once       glucagon 1 MG kit Use as directed in case of low blood sugar resulting in seizure or unconsciousness         Allergies  Allergies   Allergen Reactions     Ketorolac Difficulty breathing         Family History  family history includes Alcoholism in her father and mother; Anxiety Disorder in her father and mother; Depression in her father and mother; Multiple Sclerosis in her mother; No Known Problems in her sister.    Social History  Social History     Tobacco Use     Smoking status: Never     Smokeless tobacco: Never   Vaping Use     Vaping  Use: Every day   Substance Use Topics     Alcohol use: Yes     Comment: occasional     Drug use: Never       Physical Exam  /74 (BP Location: Left arm, Patient Position: Sitting, Cuff Size: Adult Regular)   Pulse 108   Wt 65.6 kg (144 lb 11.2 oz)   LMP 02/03/2023   SpO2 98%   BMI 24.08 kg/m    Body mass index is 24.08 kg/m .  GENERAL :  In no apparent distress  SKIN: Normal color, normal temperature, texture.  No hirsutism, alopecia or purple striae.     EYES: PERRL, EOMI, No scleral icterus,  No proptosis, conjunctival redness, stare, retraction  MOUTH: Moist, pink; pharynx clear  RESP: Lungs clear to auscultation bilaterally  CARDIAC: Regular rate and rhythm, normal S1 S2, without murmurs, rubs or gallops    NEURO: awake, alert, responds appropriately to questions.  Cranial nerves intact.   Moves all extremities; Gait normal.  No tremor of the outstretched hand.   EXTREMITIES: No clubbing, cyanosis or edema.    DATA REVIEW    FreeStyle LibreView  Current time in target range 48%  Low 3%, high 26%, and very high 23%  Current Ave  mg/dl       Again, thank you for allowing me to participate in the care of your patient.        Sincerely,        Nanette Sykes PA-C

## 2023-03-31 NOTE — PROGRESS NOTES
Assessment/Plan :   1. Type 1 DM, uncontrolled. Kimberly is doing much better. We reviewed the role of both Lantus and Novolog in managing blood sugars. She has noticed an improvement in hypoglycemia, since decreasing from 29 to 28 units, however, her post-lunch numbers seem to be higher. We will increase her insulin to carb ratio to 1:13 at lunch. She will stick with 1:12 at dinner and 1:15 with breakfast. We also reviewed what to do if her blood sugar starts to drop.     She will stick with the FreeStyle Kathe 3 and we will follow-up in 1 month.    I also completed [facility] paperwork approving her for driving privileges.     I have independently reviewed and interpreted labs, imaging as indicated.      Chief complaint:  Kimberly is a 23 year old female who returns for follow-up regarding Type 1 DM.    I have reviewed Care Everywhere including [facility], [facility], [facility], [facility], [facility] , [facility], [place] lab reports, imaging reports and provider notes as indicated.      HISTORY OF PRESENT ILLNESS  Kimberly has had diabetes since she was 9 yrs old. She has been struggling to get her blood sugars stabilized since she moved out on her own. She has used the Medtronic insulin pump and Guardian sensor, in the past. She felt like the sensors were constantly malfunctioning, so she stopped using the pump all together. She is still under warranty until 2025 but she is not sure where the insulin pump is, currently.     Most recently, she has been trying to manage her diabetes with MDI insulin therapy. She first saw me on Feb 24th. She was taking 25 units of Lantus in the morning along with Novolog, sliding scale. She was struggling with large glucose fluctuations. She was previously monitoring her blood sugars with fingerstick testing and she felt like her numbers were always low or high.     We discussed options and I would like her to, eventually, transition back to insulin pump therapy. However, we  decided to focus on getting her MDI dosing stabilized. I was also concerned about her risk for severe hyperglycemia and/or hypoglycemia. We increased her Lantus to 29 units daily and we started her on the Kathe 3 sensor.    Her blood sugars are improving. She was experiencing consistent hypoglycemia early morning and in the afternoon. She had a follow-up visit with our CDE team and they decreased her Lantus dose to 28 units. They also adjusted her Novolog insulin to carb ratio to 1:12 at dinner and 1:15 at breakfast and lunch.    Endocrine relevant labs are as follows:    Latest Reference Range & Units 02/24/23 08:55   Hemoglobin A1C POCT 4.3 - <5.7 % 12.7 !       REVIEW OF SYSTEMS    Endocrine: positive for diabetes  Skin: negative  Eyes: negative for, visual blurring  Ears/Nose/Throat: positive for nasal congestion, COVID exposure  Respiratory: No shortness of breath, dyspnea on exertion, cough, or hemoptysis  Cardiovascular: negative for, chest pain and exercise intolerance  Gastrointestinal: negative for, nausea, vomiting, constipation and diarrhea  Genitourinary: negative for, nocturia, dysuria, frequency and urgency  Musculoskeletal: negative  Neurologic: negative for and numbness or tingling of feet  Psychiatric: negative  Hematologic/Lymphatic/Immunologic: negative    Past Medical History  Past Medical History:   Diagnosis Date     Anxiety      Congenital heart disorder      Depressive disorder      Diabetes (H)      Pyelonephritis     June 2021, 2 ER visits       Medications  Current Outpatient Medications   Medication Sig Dispense Refill     Acetone, Urine, Test (KETONE TEST) STRP 1 each as needed (Concerning elevated ketone) 50 strip 0     alcohol swab prep pads Use to swab area of injection/connor as directed. 100 each 0     blood glucose (NO BRAND SPECIFIED) test strip Use to test blood sugar 4 times daily or as directed. AccuChek Guide me strip. 100 strip 0     blood glucose calibration (NO BRAND  SPECIFIED) solution To accompany: AccuChek Guide Me Glucose meter 1 each 0     blood glucose monitoring (NO BRAND SPECIFIED) meter device kit Use to test blood sugar 4 times daily or as directed. Preferred blood glucose meter OR supplies to accompany: Accuchek Guide me glucose meter. 1 kit 0     Continuous Blood Gluc Sensor (FREESTYLE TRISHA 3 SENSOR) MISC 1 each every 14 days 2 each 6     insulin aspart (NOVOLOG PEN) 100 UNIT/ML pen 1 unit per 10 carbs       insulin glargine (LANTUS PEN) 100 UNIT/ML pen Inject 25 Units Subcutaneous every morning 15 mL 0     insulin pen needle (32G X 4 MM) 32G X 4 MM miscellaneous Use as directed 4-6 needles per day       multivitamin, therapeutic (THERA-VIT) TABS tablet Take 1 tablet by mouth daily       thin (NO BRAND SPECIFIED) lancets 1 each 4 times daily (before meals and nightly) Use with lanceting device. To accompany: Blood Glucose Monitor Brands: Accu Chek Guide Me meter 100 each 0     etonogestrel (NEXPLANON) 68 MG IMPL 1 each by Subdermal route once       glucagon 1 MG kit Use as directed in case of low blood sugar resulting in seizure or unconsciousness         Allergies  Allergies   Allergen Reactions     Ketorolac Difficulty breathing         Family History  family history includes Alcoholism in her father and mother; Anxiety Disorder in her father and mother; Depression in her father and mother; Multiple Sclerosis in her mother; No Known Problems in her sister.    Social History  Social History     Tobacco Use     Smoking status: Never     Smokeless tobacco: Never   Vaping Use     Vaping Use: Every day   Substance Use Topics     Alcohol use: Yes     Comment: occasional     Drug use: Never       Physical Exam  /74 (BP Location: Left arm, Patient Position: Sitting, Cuff Size: Adult Regular)   Pulse 108   Wt 65.6 kg (144 lb 11.2 oz)   LMP 02/03/2023   SpO2 98%   BMI 24.08 kg/m    Body mass index is 24.08 kg/m .  GENERAL :  In no apparent distress  SKIN: Normal  color, normal temperature, texture.  No hirsutism, alopecia or purple striae.     EYES: PERRL, EOMI, No scleral icterus,  No proptosis, conjunctival redness, stare, retraction  MOUTH: Moist, pink; pharynx clear  RESP: Lungs clear to auscultation bilaterally  CARDIAC: Regular rate and rhythm, normal S1 S2, without murmurs, rubs or gallops    NEURO: awake, alert, responds appropriately to questions.  Cranial nerves intact.   Moves all extremities; Gait normal.  No tremor of the outstretched hand.   EXTREMITIES: No clubbing, cyanosis or edema.    DATA REVIEW    FreeStyle LibreView  Current time in target range 48%  Low 3%, high 26%, and very high 23%  Current Ave  mg/dl

## 2023-03-31 NOTE — PATIENT INSTRUCTIONS
[facility]-Department of Endocrinology  Diabetes Educators:   Mary Hansen, RN and Karyn Brown RN  Clinic Nurse: JEREMI Valdez  CMA's: Corona DELAROSAN: Earnestine  Scheduling/Clinic phone number : [phone]   Clinic Fax: [phone]  On-Call Endocrine at the [place] (after hours/weekends): [phone] option 4    Please call the number below to schedule your labs.    [facility] [phone]   [facility] [phone]   [place] [phone]   [facility]  [phone]   [facility] [phone]   [place] [phone]   [facility]) [phone]   [facility]   [place] [phone]   [place] [phone]   [place] [phone]   [place] [phone]     Please reach out to the following centers to schedule your imaging appointment:       Imaging (DEXA, CT, MRI, XRAY)    [facility] ([facility], [facility], [place]) [phone]   [facility] ([place], [place]) [phone]   [facility] ([facility]) [phone]   [facility] ([facility]) [phone]     Appointment Reminders:  * Please bring meter with for staff to download  * If you are due ONLY for an A1C, it is scheduled with the nurse and will be done in clinic. You do not need to schedule a lab appointment. Fasting is not required for an A1C.  * Refill request should be submitted to your pharmacy. They will contact clinic for approval.

## 2023-03-31 NOTE — NURSING NOTE
Insulin-Treated Diabetes Mellitus Report completed and signed by Nanette Sykes.    Faxed to [place] [facility] [address] and [employer] [phone].    Original mailed to patient. Copy sent down to scanning.     Claire Zuniga, [facility]  Adult Endocrinology  [facility]

## 2023-04-03 NOTE — TELEPHONE ENCOUNTER
Per 3/31/23 nursing note at appointment with Nanette Sykes, form has been completed. No further action needed.    Kim Pichardo [facility]  Adult Endocrinology  [facility]

## 2023-04-15 ENCOUNTER — HEALTH MAINTENANCE LETTER (OUTPATIENT)
Age: 23
End: 2023-04-15

## 2023-04-30 ENCOUNTER — MYC MEDICAL ADVICE (OUTPATIENT)
Dept: ENDOCRINOLOGY | Facility: CLINIC | Age: 23
End: 2023-04-30
Payer: COMMERCIAL

## 2023-06-02 ENCOUNTER — HEALTH MAINTENANCE LETTER (OUTPATIENT)
Age: 23
End: 2023-06-02

## 2023-08-08 ENCOUNTER — MYC MEDICAL ADVICE (OUTPATIENT)
Dept: OBGYN | Facility: CLINIC | Age: 23
End: 2023-08-08
Payer: COMMERCIAL

## 2023-09-16 ENCOUNTER — HEALTH MAINTENANCE LETTER (OUTPATIENT)
Age: 23
End: 2023-09-16

## 2023-11-27 ENCOUNTER — OFFICE VISIT (OUTPATIENT)
Dept: OBGYN | Facility: CLINIC | Age: 23
End: 2023-11-27
Payer: COMMERCIAL

## 2023-11-27 VITALS
SYSTOLIC BLOOD PRESSURE: 126 MMHG | HEART RATE: 96 BPM | TEMPERATURE: 98.2 F | DIASTOLIC BLOOD PRESSURE: 80 MMHG | RESPIRATION RATE: 16 BRPM | BODY MASS INDEX: 22.8 KG/M2 | WEIGHT: 137 LBS

## 2023-11-27 DIAGNOSIS — Z30.46 ENCOUNTER FOR NEXPLANON REMOVAL: Primary | ICD-10-CM

## 2023-11-27 DIAGNOSIS — N94.10 DYSPAREUNIA IN FEMALE: ICD-10-CM

## 2023-11-27 DIAGNOSIS — R68.82 DECREASED LIBIDO: ICD-10-CM

## 2023-11-27 PROCEDURE — 11982 REMOVE DRUG IMPLANT DEVICE: CPT | Performed by: OBSTETRICS & GYNECOLOGY

## 2023-11-27 PROCEDURE — 99213 OFFICE O/P EST LOW 20 MIN: CPT | Mod: 25 | Performed by: OBSTETRICS & GYNECOLOGY

## 2023-11-27 NOTE — PROGRESS NOTES
"GYN Procedure Note     CC: Nexplanon removal     HPI: Kimberly Patterson is a 24 YO G0 who presents for Nexplanon removal. She had the Nexplanon placed about 3 years ago and has had ongoing issues with decreased libido and is concerned that the implant may be contributing. She was previously seen for vaginitis which has resolved as well as dyspareunia. She was referred to pelvic floor PT at that time but hasn't been able to follow up with them. She has been avoiding penetrative intercourse with her partner but reports no sexual desire despite enjoying other sexual activities/masturbation in the past. She and her partner have talked and are open to pregnancy if it were to occur and she would like to go off all hormonal contraception at this time.     O:Vital signs:  Temp: 98.2  F (36.8  C)   BP: 126/80 Pulse: 96   Resp: 16         Weight: 62.1 kg (137 lb)  Estimated body mass index is 22.8 kg/m  as calculated from the following:    Height as of 3/1/23: 1.651 m (5' 5\").    Weight as of this encounter: 62.1 kg (137 lb).    General: Patient alert and oriented, no acute distress  CV: no peripheral edema or cyanosis  Resp: normal respiratory effort and equal lung expansion  Ext: non-tender, no edema    Nexplanon removal procedure     After informed consent was obtained the device was located and the overlying skin was prepped with Betadine.  2 cc of 1% Lidocaine was injected at the site of incision and a scalpel was used to open the skin.  The Etonorgestrel device was located and brought to the opening made in the skin.  It was then grasped with a hemostat and the capsule was removed until the device could be easily removed.  Skin glue was placed over the incision and the arm was wrapped with gauze. EBL minimal. Patient tolerated the procedure well without complication.      Assessment and plan:   (Z30.46) Encounter for Nexplanon removal  (primary encounter diagnosis)  -Implant removed without difficulty, reviewed " "post-procedure restrictions and when to call for bleeding/infection  -Patient open to pregnancy at this time, declines hormonal contraception. Discussed barrier methods, diaphragm and copper IUD if she decides she would like to use non hormonal contraception. Encouraged her to start PNV if she is at risk for pregnancy in the future.   Plan: Remove Contraceptive Implant -         Nexplanon/Implanon            (N94.10) Dyspareunia in female  -Recommend that she follow up with pelvic floor PT, referral previously placed     (R68.82) Decreased libido  -We discussed that decreased libido is a very common issue that affects many women and that it is typically multifactorial. We discussed that libido varies from person to person and it is considered abnormal when it is bothersome to the patient.  -We reviewed different options including scheduled \"date nights\" with her partner, scheduled intercourse or masturbation to increase her sex drive if she doesn't notice improvement with removal of the Nexplanon     Dispo: RTC as needed   Rhiannon Pino MD      "

## 2024-02-03 ENCOUNTER — HEALTH MAINTENANCE LETTER (OUTPATIENT)
Age: 24
End: 2024-02-03

## 2024-02-07 NOTE — PROGRESS NOTES
Outcome for 02/07/24 1:27 PM: Device upload instructions sent to patient via Rayne Zuniga CMA  Adult Endocrinology  [facility] [place]

## 2024-02-09 ENCOUNTER — OFFICE VISIT (OUTPATIENT)
Dept: ENDOCRINOLOGY | Facility: CLINIC | Age: 24
End: 2024-02-09
Payer: COMMERCIAL

## 2024-02-09 VITALS
OXYGEN SATURATION: 99 % | RESPIRATION RATE: 18 BRPM | BODY MASS INDEX: 21.97 KG/M2 | DIASTOLIC BLOOD PRESSURE: 81 MMHG | SYSTOLIC BLOOD PRESSURE: 116 MMHG | WEIGHT: 132 LBS | HEART RATE: 101 BPM

## 2024-02-09 DIAGNOSIS — E10.65 TYPE 1 DIABETES MELLITUS WITH HYPERGLYCEMIA (H): ICD-10-CM

## 2024-02-09 LAB — HBA1C MFR BLD: 10.6 % (ref 4.3–?)

## 2024-02-09 PROCEDURE — 83036 HEMOGLOBIN GLYCOSYLATED A1C: CPT | Performed by: PHYSICIAN ASSISTANT

## 2024-02-09 PROCEDURE — 99214 OFFICE O/P EST MOD 30 MIN: CPT | Performed by: PHYSICIAN ASSISTANT

## 2024-02-09 RX ORDER — BLOOD-GLUCOSE SENSOR
1 EACH MISCELLANEOUS
Qty: 2 EACH | Refills: 6 | Status: SHIPPED | OUTPATIENT
Start: 2024-02-09

## 2024-02-09 NOTE — NURSING NOTE
Kimberly Patterson's goals for this visit include:   Chief Complaint   Patient presents with    Follow Up     DMI     She requests these members of her care team be copied on today's visit information: yes    PCP: Emily Moyer    Referring Provider:  Referred Self, MD  No address on file    /81 (BP Location: Left arm, Patient Position: Sitting, Cuff Size: Adult Regular)   Pulse 101   Resp 18   Wt 59.9 kg (132 lb)   SpO2 99%   BMI 21.97 kg/m      Do you need any medication refills at today's visit? None    Sagar Wilson, EMT

## 2024-02-09 NOTE — PROGRESS NOTES
Assessment/Plan :   Type 1 DM. Kimberly is worried about her blood sugars. Her mother passed away in April of 2023 and she really struggled take care of herself due to depression. She is doing better now and she has an appt with a counselor scheduled for next week. Kimberly has been taking insulin every day but she has not been monitoring her blood sugars. She cannot afford the Kathe 3 and she is not great about fingerstick testing. I will give her two samples of the Kathe 3 today. I cannot make adjustments to her insulin without any information. We did review the basic roles of basal insulin and pre-meal insulin. Kimberly is also considering pregnancy. We discussed the importance of tight glucose control during pregnancy. She needs to work on getting her hemoglobin A1C under 7% and it would be ideal if her A1C was 6%. She states that she is not planning on pregnancy anytime soon but she would like to consider it, in the near future. She is due for routine laboratory testing and we will follow-up in 3-4 mos.      I have independently reviewed and interpreted labs, imaging as indicated.      Chief complaint:  Kimberly is a 24 year old female who returns for follow-up of Type 1 DM.    I have reviewed Care Everywhere including [facility], [facility], [facility] , [facility], [place] lab reports, imaging reports and provider notes as indicated.      HISTORY OF PRESENT ILLNESS  Kimberly expected her hemoglobin A1C to be elevated. She has not been consistent with glucose management. She cannot afford the FreeStyle Kathe 3 sensors and she has been only checking her blood sugars 1-2x daily. She has also been under a lot of stress. Her mother passed away last April and she struggled with a period of depression. She has continued to take Lantus 28 unit(s) daily and Novolog 1 unit(s) for every 10 grams of carbs.    Kimberly was diagnosed with diabetes at the age of 9. She has struggled with glucose  control since diagnosis. She states that her lowest hemoglobin A1C was around 8% when she was a teenager. She has used both MDI insulin therapy and insulin pump therapy, in the past. She feels like she had better control when she was using the Medtronic insulin pump but she didn't like the fact that something was attached to her. She also did not like the Guardian sensor. She has considered getting back on the insulin pump but she is not ready, yet.    She has not had any problems with severe hyperglycemia and/or hypoglycemia. She does have a history of hospitalizations related to diabetic ketoacidosis. She also denies any problems with blurred vision and she has no history of retinopathy. She denies any problems with numbness/tingling in her feet. She also has no history of microalbuminuria.    Endocrine relevant labs are as follows:   Latest Reference Range & Units 02/09/24 12:33   Hemoglobin A1C POCT 4.3 - <5.7 % 10.6 !   !: Data is abnormal    REVIEW OF SYSTEMS    Endocrine: positive for diabetes  Skin: negative  Eyes: negative for, visual blurring, redness, tearing  Ears/Nose/Throat: negative  Respiratory: No shortness of breath, dyspnea on exertion, cough, or hemoptysis  Cardiovascular: negative for, chest pain, dyspnea on exertion, lower extremity edema, and exercise intolerance  Gastrointestinal: negative for, nausea, vomiting, constipation, and diarrhea  Genitourinary: negative for, nocturia, dysuria, frequency, and urgency  Musculoskeletal: negative for, muscular weakness, nocturnal cramping, and foot pain  Neurologic: negative for, local weakness, numbness or tingling of hands, and numbness or tingling of feet  Psychiatric: negative  Hematologic/Lymphatic/Immunologic: negative    Past Medical History  Past Medical History:   Diagnosis Date    Anxiety     Congenital heart disorder     Depressive disorder     Diabetes (H)     Pyelonephritis     June 2021, 2 ER visits       Medications  Current Outpatient  Medications   Medication Sig Dispense Refill    Acetone, Urine, Test (KETONE TEST) STRP 1 each as needed (Concerning elevated ketone) 50 strip 0    alcohol swab prep pads Use to swab area of injection/connor as directed. 100 each 0    blood glucose (NO BRAND SPECIFIED) test strip Use to test blood sugar 4 times daily or as directed. AccuChek Guide me strip. 100 strip 0    blood glucose calibration (NO BRAND SPECIFIED) solution To accompany: AccuChek Guide Me Glucose meter 1 each 0    blood glucose monitoring (NO BRAND SPECIFIED) meter device kit Use to test blood sugar 4 times daily or as directed. Preferred blood glucose meter OR supplies to accompany: Accuchek Guide me glucose meter. 1 kit 0    Continuous Blood Gluc Sensor (FREESTYLE TRISHA 3 SENSOR) MISC 1 each every 14 days 2 each 6    glucagon 1 MG kit Use as directed in case of low blood sugar resulting in seizure or unconsciousness      insulin aspart (NOVOLOG PEN) 100 UNIT/ML pen Inject 20 Units Subcutaneous 3 times daily (with meals) 1 unit per 10 carbs 60 mL 1    insulin glargine (LANTUS PEN) 100 UNIT/ML pen Inject 30 Units Subcutaneous every morning 30 mL 1    insulin pen needle (32G X 4 MM) 32G X 4 MM miscellaneous Inject Subcutaneous 4 times daily Use 4 pen needles daily or as directed. 400 each 1    multivitamin, therapeutic (THERA-VIT) TABS tablet Take 1 tablet by mouth daily      thin (NO BRAND SPECIFIED) lancets 1 each 4 times daily (before meals and nightly) Use with lanceting device. To accompany: Blood Glucose Monitor Brands: Accu Chek Guide Me meter 100 each 0    etonogestrel (NEXPLANON) 68 MG IMPL 1 each by Subdermal route once (Patient not taking: Reported on 2/9/2024)         Allergies  Allergies   Allergen Reactions    Ketorolac Difficulty breathing         Family History  family history includes Alcoholism in her father and mother; Anxiety Disorder in her father and mother; Depression in her father and mother; Multiple Sclerosis in her  mother; No Known Problems in her sister.    Social History  Social History     Tobacco Use    Smoking status: Never     Passive exposure: Never    Smokeless tobacco: Never   Vaping Use    Vaping Use: Every day   Substance Use Topics    Alcohol use: Yes     Comment: occasional    Drug use: Never       Physical Exam  /81 (BP Location: Left arm, Patient Position: Sitting, Cuff Size: Adult Regular)   Pulse 101   Resp 18   Wt 59.9 kg (132 lb)   SpO2 99%   BMI 21.97 kg/m    Body mass index is 21.97 kg/m .  GENERAL :  In no apparent distress  SKIN: Normal color, normal temperature, texture.  No hirsutism, alopecia or purple striae.     EYES: PERRL, EOMI, No scleral icterus,  No proptosis, conjunctival redness, stare, retraction  RESP: Lungs clear to auscultation bilaterally  CARDIAC: Regular rate and rhythm, normal S1 S2, without murmurs, rubs or gallops    NEURO: awake, alert, responds appropriately to questions.  Cranial nerves intact.   Moves all extremities; Gait normal.  No tremor of the outstretched hand.    EXTREMITIES: No clubbing, cyanosis or edema.    DATA REVIEW  She is not currently monitoring her blood sugars

## 2024-02-09 NOTE — LETTER
2/9/2024         RE: Kimberly Patterson  [address]  [place] [address]        Dear Colleague,    Thank you for referring your patient, Kimberly Patterson, to the [facility]. Please see a copy of my visit note below.    Outcome for 02/07/24 1:27 PM: Device upload instructions sent to patient via [employer] - kathe Zuniga CMA  Adult Endocrinology  [facility], [place]      Assessment/Plan :   Type 1 DM. Kimberly is worried about her blood sugars. Her mother passed away in April of 2023 and she really struggled take care of herself due to depression. She is doing better now and she has an appt with a counselor scheduled for next week. Kimberly has been taking insulin every day but she has not been monitoring her blood sugars. She cannot afford the Kathe 3 and she is not great about fingerstick testing. I will give her two samples of the Kathe 3 today. I cannot make adjustments to her insulin without any information. We did review the basic roles of basal insulin and pre-meal insulin. Kimberly is also considering pregnancy. We discussed the importance of tight glucose control during pregnancy. She needs to work on getting her hemoglobin A1C under 7% and it would be ideal if her A1C was 6%. She states that she is not planning on pregnancy anytime soon but she would like to consider it, in the near future. She is due for routine laboratory testing and we will follow-up in 3-4 mos.      I have independently reviewed and interpreted labs, imaging as indicated.      Chief complaint:  Kimberly is a 24 year old female who returns for follow-up of Type 1 DM.    I have reviewed Care Everywhere including [facility], [facility], [facility], [facility], [place], [facility], [facility] , [facility], [place] lab reports, imaging reports and provider notes as indicated.      HISTORY OF PRESENT ILLNESS  Kimberly expected her hemoglobin A1C to be elevated. She has not been consistent with glucose management. She  cannot afford the FreeStyle Kathe 3 sensors and she has been only checking her blood sugars 1-2x daily. She has also been under a lot of stress. Her mother passed away last April and she struggled with a period of depression. She has continued to take Lantus 28 unit(s) daily and Novolog 1 unit(s) for every 10 grams of carbs.    Kimberly was diagnosed with diabetes at the age of 9. She has struggled with glucose control since diagnosis. She states that her lowest hemoglobin A1C was around 8% when she was a teenager. She has used both MDI insulin therapy and insulin pump therapy, in the past. She feels like she had better control when she was using the Medtronic insulin pump but she didn't like the fact that something was attached to her. She also did not like the Guardian sensor. She has considered getting back on the insulin pump but she is not ready, yet.    She has not had any problems with severe hyperglycemia and/or hypoglycemia. She does have a history of hospitalizations related to diabetic ketoacidosis. She also denies any problems with blurred vision and she has no history of retinopathy. She denies any problems with numbness/tingling in her feet. She also has no history of microalbuminuria.    Endocrine relevant labs are as follows:   Latest Reference Range & Units 02/09/24 12:33   Hemoglobin A1C POCT 4.3 - <5.7 % 10.6 !   !: Data is abnormal    REVIEW OF SYSTEMS    Endocrine: positive for diabetes  Skin: negative  Eyes: negative for, visual blurring, redness, tearing  Ears/Nose/Throat: negative  Respiratory: No shortness of breath, dyspnea on exertion, cough, or hemoptysis  Cardiovascular: negative for, chest pain, dyspnea on exertion, lower extremity edema, and exercise intolerance  Gastrointestinal: negative for, nausea, vomiting, constipation, and diarrhea  Genitourinary: negative for, nocturia, dysuria, frequency, and urgency  Musculoskeletal: negative for, muscular weakness, nocturnal cramping, and foot  pain  Neurologic: negative for, local weakness, numbness or tingling of hands, and numbness or tingling of feet  Psychiatric: negative  Hematologic/Lymphatic/Immunologic: negative    Past Medical History  Past Medical History:   Diagnosis Date     Anxiety      Congenital heart disorder      Depressive disorder      Diabetes (H)      Pyelonephritis     June 2021, 2 ER visits       Medications  Current Outpatient Medications   Medication Sig Dispense Refill     Acetone, Urine, Test (KETONE TEST) STRP 1 each as needed (Concerning elevated ketone) 50 strip 0     alcohol swab prep pads Use to swab area of injection/connor as directed. 100 each 0     blood glucose (NO BRAND SPECIFIED) test strip Use to test blood sugar 4 times daily or as directed. AccuChek Guide me strip. 100 strip 0     blood glucose calibration (NO BRAND SPECIFIED) solution To accompany: AccuChek Guide Me Glucose meter 1 each 0     blood glucose monitoring (NO BRAND SPECIFIED) meter device kit Use to test blood sugar 4 times daily or as directed. Preferred blood glucose meter OR supplies to accompany: Accuchek Guide me glucose meter. 1 kit 0     Continuous Blood Gluc Sensor (FREESTYLE TRISHA 3 SENSOR) MISC 1 each every 14 days 2 each 6     glucagon 1 MG kit Use as directed in case of low blood sugar resulting in seizure or unconsciousness       insulin aspart (NOVOLOG PEN) 100 UNIT/ML pen Inject 20 Units Subcutaneous 3 times daily (with meals) 1 unit per 10 carbs 60 mL 1     insulin glargine (LANTUS PEN) 100 UNIT/ML pen Inject 30 Units Subcutaneous every morning 30 mL 1     insulin pen needle (32G X 4 MM) 32G X 4 MM miscellaneous Inject Subcutaneous 4 times daily Use 4 pen needles daily or as directed. 400 each 1     multivitamin, therapeutic (THERA-VIT) TABS tablet Take 1 tablet by mouth daily       thin (NO BRAND SPECIFIED) lancets 1 each 4 times daily (before meals and nightly) Use with lanceting device. To accompany: Blood Glucose Monitor Brands:  Accu Chek Guide Me meter 100 each 0     etonogestrel (NEXPLANON) 68 MG IMPL 1 each by Subdermal route once (Patient not taking: Reported on 2/9/2024)         Allergies  Allergies   Allergen Reactions     Ketorolac Difficulty breathing         Family History  family history includes Alcoholism in her father and mother; Anxiety Disorder in her father and mother; Depression in her father and mother; Multiple Sclerosis in her mother; No Known Problems in her sister.    Social History  Social History     Tobacco Use     Smoking status: Never     Passive exposure: Never     Smokeless tobacco: Never   Vaping Use     Vaping Use: Every day   Substance Use Topics     Alcohol use: Yes     Comment: occasional     Drug use: Never       Physical Exam  /81 (BP Location: Left arm, Patient Position: Sitting, Cuff Size: Adult Regular)   Pulse 101   Resp 18   Wt 59.9 kg (132 lb)   SpO2 99%   BMI 21.97 kg/m    Body mass index is 21.97 kg/m .  GENERAL :  In no apparent distress  SKIN: Normal color, normal temperature, texture.  No hirsutism, alopecia or purple striae.     EYES: PERRL, EOMI, No scleral icterus,  No proptosis, conjunctival redness, stare, retraction  RESP: Lungs clear to auscultation bilaterally  CARDIAC: Regular rate and rhythm, normal S1 S2, without murmurs, rubs or gallops    NEURO: awake, alert, responds appropriately to questions.  Cranial nerves intact.   Moves all extremities; Gait normal.  No tremor of the outstretched hand.    EXTREMITIES: No clubbing, cyanosis or edema.    DATA REVIEW  She is not currently monitoring her blood sugars        Again, thank you for allowing me to participate in the care of your patient.        Sincerely,        Nanette Sykes PA-C

## 2024-06-20 ENCOUNTER — OFFICE VISIT (OUTPATIENT)
Dept: ENDOCRINOLOGY | Facility: CLINIC | Age: 24
End: 2024-06-20
Payer: COMMERCIAL

## 2024-06-20 VITALS
SYSTOLIC BLOOD PRESSURE: 113 MMHG | RESPIRATION RATE: 16 BRPM | HEIGHT: 65 IN | OXYGEN SATURATION: 100 % | HEART RATE: 93 BPM | BODY MASS INDEX: 23.29 KG/M2 | DIASTOLIC BLOOD PRESSURE: 76 MMHG | WEIGHT: 139.8 LBS

## 2024-06-20 DIAGNOSIS — E10.65 TYPE 1 DIABETES MELLITUS WITH HYPERGLYCEMIA (H): Primary | ICD-10-CM

## 2024-06-20 LAB
ANION GAP SERPL CALCULATED.3IONS-SCNC: 10 MMOL/L (ref 7–15)
BUN SERPL-MCNC: 7.7 MG/DL (ref 6–20)
CALCIUM SERPL-MCNC: 9.4 MG/DL (ref 8.6–10)
CHLORIDE SERPL-SCNC: 103 MMOL/L (ref 98–107)
CHOLEST SERPL-MCNC: 196 MG/DL
CREAT SERPL-MCNC: 0.56 MG/DL (ref 0.51–0.95)
DEPRECATED HCO3 PLAS-SCNC: 26 MMOL/L (ref 22–29)
EGFRCR SERPLBLD CKD-EPI 2021: >90 ML/MIN/1.73M2
FASTING STATUS PATIENT QL REPORTED: NO
FASTING STATUS PATIENT QL REPORTED: NO
GLUCOSE SERPL-MCNC: 89 MG/DL (ref 70–99)
HBA1C MFR BLD: 11 %
HDLC SERPL-MCNC: 65 MG/DL
LDLC SERPL CALC-MCNC: 120 MG/DL
NONHDLC SERPL-MCNC: 131 MG/DL
POTASSIUM SERPL-SCNC: 4.2 MMOL/L (ref 3.4–5.3)
SODIUM SERPL-SCNC: 139 MMOL/L (ref 135–145)
TRIGL SERPL-MCNC: 54 MG/DL
TSH SERPL DL<=0.005 MIU/L-ACNC: 1.62 UIU/ML (ref 0.3–4.2)

## 2024-06-20 PROCEDURE — 36415 COLL VENOUS BLD VENIPUNCTURE: CPT | Performed by: PHYSICIAN ASSISTANT

## 2024-06-20 PROCEDURE — 82570 ASSAY OF URINE CREATININE: CPT | Performed by: PHYSICIAN ASSISTANT

## 2024-06-20 PROCEDURE — 99214 OFFICE O/P EST MOD 30 MIN: CPT | Performed by: PHYSICIAN ASSISTANT

## 2024-06-20 PROCEDURE — 84443 ASSAY THYROID STIM HORMONE: CPT | Performed by: PHYSICIAN ASSISTANT

## 2024-06-20 PROCEDURE — 80061 LIPID PANEL: CPT | Performed by: PHYSICIAN ASSISTANT

## 2024-06-20 PROCEDURE — 82043 UR ALBUMIN QUANTITATIVE: CPT | Performed by: PHYSICIAN ASSISTANT

## 2024-06-20 PROCEDURE — 83036 HEMOGLOBIN GLYCOSYLATED A1C: CPT | Performed by: PHYSICIAN ASSISTANT

## 2024-06-20 PROCEDURE — 80048 BASIC METABOLIC PNL TOTAL CA: CPT | Performed by: PHYSICIAN ASSISTANT

## 2024-06-20 NOTE — LETTER
6/20/2024      Kimberly Patterson  [address]  [place] [address]      Dear Colleague,    Thank you for referring your patient, Kimberly Patterson, to the [facility]. Please see a copy of my visit note below.            Endocrine Consult note    Attending Assessment/Plan :   Type 1 DM. Kimberly would like to get her blood sugars under better control. She is happy to be back on the Kathe sensor and she has already noticed an improvement in her blood sugars. We reviewed her recent [facility] report and her fasting blood sugars look okay. She is still experiencing a lot of post-prandial hyperglycemia and we discussed the importance of taking the pre-meal insulin aspart, before meals. She will continue to work on the timing of her pre-meal insulin along with accurate carb counting. I am not going to make any adjustments to her insulin dosing today. We will follow-up in 3-4 mos.      I have independently reviewed and interpreted labs, imaging as indicated.      Chief complaint:  Kimberly is a 24 year old female who returns for follow-up of Type 1 DM.    I have reviewed Care Everywhere including [facility], [facility], [facility], [facility], [facility], [facility] , [facility], [place] lab reports, imaging reports and provider notes as indicated.      HISTORY OF PRESENT ILLNESS  Kimberly continues to work on managing her blood sugars. Her numbers are high but she has noticed an improvement since she got back on the Kathe sensor. She has continued to take 28 unit(s) of Lantus every morning along with insulin aspart before meals based on an insulin to carb ratio of 1 unit(s) for every 10-12 grams of carbs. She has really been working to take the insulin before or with the meal but she is not always successful.     Kimberly has not had any problems with severe hyperglycemia and/or hypoglycemia but her blood sugars are typically over 200 mg/dl. She also went to the ER in April for possible DKA and she has a  history of hospitalization for DKA. She also has not had any problems with worsening vision or an increase in numbness/tingling in her feet. Lastly, she has no history of microalbuminuria.    Kimberly was diagnosed with diabetes at the age of 9. She has struggled with glucose control since diagnosis. She states that her lowest hemoglobin A1C was around 8% when she was a teenager. She has used both MDI insulin therapy and insulin pump therapy, in the past. She feels like she had better control when she was using the Medtronic insulin pump but she didn't like the fact that something was attached to her. She also did not like the Guardian sensor. She would like to work on getting her blood sugars under better control through MDI insulin therapy.    Endocrine relevant labs are as follows:   Latest Reference Range & Units 06/20/24 12:30   Afinion Hemoglobin A1c POCT <=5.7 % 11.0 (H)   (H): Data is abnormally high   Latest Reference Range & Units 02/09/24 12:33   Hemoglobin A1C POCT 4.3 - <5.7 % 10.6 !   !: Data is abnormal    REVIEW OF SYSTEMS    Endocrine: positive for diabetes  Skin: negative  Eyes: negative for, visual blurring, redness, tearing  Ears/Nose/Throat: negative  Respiratory: No shortness of breath, dyspnea on exertion, cough, or hemoptysis  Cardiovascular: negative for, chest pain, dyspnea on exertion, lower extremity edema, and exercise intolerance  Gastrointestinal: negative for, nausea, vomiting, constipation, and diarrhea  Genitourinary: negative for, nocturia, dysuria, frequency, and urgency  Musculoskeletal: negative for, muscular weakness, nocturnal cramping, and foot pain  Neurologic: negative for, local weakness, numbness or tingling of hands, and numbness or tingling of feet  Psychiatric: negative  Hematologic/Lymphatic/Immunologic: negative    Past Medical History  Past Medical History:   Diagnosis Date     Anxiety      Congenital heart disorder      Depressive disorder      Diabetes (H)       Pyelonephritis     June 2021, 2 ER visits       Medications  Current Outpatient Medications   Medication Sig Dispense Refill     Acetone, Urine, Test (KETONE TEST) STRP 1 each as needed (Concerning elevated ketone) 50 strip 0     alcohol swab prep pads Use to swab area of injection/connor as directed. 100 each 0     blood glucose (NO BRAND SPECIFIED) test strip Use to test blood sugar 4 times daily or as directed. AccuChek Guide me strip. 100 strip 0     blood glucose calibration (NO BRAND SPECIFIED) solution To accompany: AccuChek Guide Me Glucose meter 1 each 0     blood glucose monitoring (NO BRAND SPECIFIED) meter device kit Use to test blood sugar 4 times daily or as directed. Preferred blood glucose meter OR supplies to accompany: Accuchek Guide me glucose meter. 1 kit 0     Continuous Blood Gluc Sensor (FREESTYLE TRISHA 3 SENSOR) MISC 1 each every 14 days 2 each 6     glucagon 1 MG kit Use as directed in case of low blood sugar resulting in seizure or unconsciousness       insulin aspart (NOVOLOG PEN) 100 UNIT/ML pen Inject 20 Units Subcutaneous 3 times daily (with meals) 1 unit per 10 carbs 60 mL 1     insulin glargine (LANTUS PEN) 100 UNIT/ML pen Inject 30 Units Subcutaneous every morning 30 mL 1     insulin pen needle (32G X 4 MM) 32G X 4 MM miscellaneous Inject Subcutaneous 4 times daily Use 4 pen needles daily or as directed. 400 each 1     multivitamin, therapeutic (THERA-VIT) TABS tablet Take 1 tablet by mouth daily       thin (NO BRAND SPECIFIED) lancets 1 each 4 times daily (before meals and nightly) Use with lanceting device. To accompany: Blood Glucose Monitor Brands: Accu Chek Guide Me meter 100 each 0     etonogestrel (NEXPLANON) 68 MG IMPL 1 each by Subdermal route once (Patient not taking: Reported on 2/9/2024)         Allergies  Allergies   Allergen Reactions     Ketorolac Difficulty breathing         Family History  family history includes Alcoholism in her father and mother; Anxiety Disorder  "in her father and mother; Depression in her father and mother; Multiple Sclerosis in her mother; No Known Problems in her sister.    Social History  Social History     Tobacco Use     Smoking status: Never     Passive exposure: Never     Smokeless tobacco: Never   Vaping Use     Vaping status: Every Day   Substance Use Topics     Alcohol use: Yes     Comment: occasional     Drug use: Never       Physical Exam  /76 (BP Location: Left arm, Patient Position: Sitting, Cuff Size: Adult Regular)   Pulse 93   Resp 16   Ht 1.651 m (5' 5\")   Wt 63.4 kg (139 lb 12.8 oz)   SpO2 100%   BMI 23.26 kg/m    Body mass index is 23.26 kg/m .  GENERAL :  In no apparent distress  SKIN: Normal color, normal temperature, texture.  No hirsutism, alopecia or purple striae.     EYES: PERRL, EOMI, No scleral icterus,  No proptosis, conjunctival redness, stare, retraction  RESP: Lungs clear to auscultation bilaterally  CARDIAC: Regular rate and rhythm, normal S1 S2, without murmurs, rubs or gallops  NEURO: awake, alert, responds appropriately to questions.  Cranial nerves intact.   Moves all extremities; Gait normal.  No tremor of the outstretched hand.    EXTREMITIES: No clubbing, cyanosis or edema.    DATA REVIEW  FreeStyle LibreView  Time in target range 23%  High 27%, Very High 50%  Current Ave  mg/dl        Again, thank you for allowing me to participate in the care of your patient.        Sincerely,        Nanette Sykes PA-C  "

## 2024-06-20 NOTE — PROGRESS NOTES
Endocrine Consult note    Attending Assessment/Plan :   Type 1 DM. Kimberly would like to get her blood sugars under better control. She is happy to be back on the Kathe sensor and she has already noticed an improvement in her blood sugars. We reviewed her recent CGMS report and her fasting blood sugars look okay. She is still experiencing a lot of post-prandial hyperglycemia and we discussed the importance of taking the pre-meal insulin aspart, before meals. She will continue to work on the timing of her pre-meal insulin along with accurate carb counting. I am not going to make any adjustments to her insulin dosing today. We will follow-up in 3-4 mos.      I have independently reviewed and interpreted labs, imaging as indicated.      Chief complaint:  Kimberly is a 24 year old female who returns for follow-up of Type 1 DM.    I have reviewed Care Everywhere including [facility], [facility], [facility], [facility], [facility] , [facility], [place] lab reports, imaging reports and provider notes as indicated.      HISTORY OF PRESENT ILLNESS  Kimberly continues to work on managing her blood sugars. Her numbers are high but she has noticed an improvement since she got back on the Kathe sensor. She has continued to take 28 unit(s) of Lantus every morning along with insulin aspart before meals based on an insulin to carb ratio of 1 unit(s) for every 10-12 grams of carbs. She has really been working to take the insulin before or with the meal but she is not always successful.     Kimberly has not had any problems with severe hyperglycemia and/or hypoglycemia but her blood sugars are typically over 200 mg/dl. She also went to the ER in April for possible DKA and she has a history of hospitalization for DKA. She also has not had any problems with worsening vision or an increase in numbness/tingling in her feet. Lastly, she has no history of microalbuminuria.    Kimberly was diagnosed with diabetes at the age of 9. She has  struggled with glucose control since diagnosis. She states that her lowest hemoglobin A1C was around 8% when she was a teenager. She has used both MDI insulin therapy and insulin pump therapy, in the past. She feels like she had better control when she was using the Medtronic insulin pump but she didn't like the fact that something was attached to her. She also did not like the Guardian sensor. She would like to work on getting her blood sugars under better control through MDI insulin therapy.    Endocrine relevant labs are as follows:   Latest Reference Range & Units 06/20/24 12:30   Afinion Hemoglobin A1c POCT <=5.7 % 11.0 (H)   (H): Data is abnormally high   Latest Reference Range & Units 02/09/24 12:33   Hemoglobin A1C POCT 4.3 - <5.7 % 10.6 !   !: Data is abnormal    REVIEW OF SYSTEMS    Endocrine: positive for diabetes  Skin: negative  Eyes: negative for, visual blurring, redness, tearing  Ears/Nose/Throat: negative  Respiratory: No shortness of breath, dyspnea on exertion, cough, or hemoptysis  Cardiovascular: negative for, chest pain, dyspnea on exertion, lower extremity edema, and exercise intolerance  Gastrointestinal: negative for, nausea, vomiting, constipation, and diarrhea  Genitourinary: negative for, nocturia, dysuria, frequency, and urgency  Musculoskeletal: negative for, muscular weakness, nocturnal cramping, and foot pain  Neurologic: negative for, local weakness, numbness or tingling of hands, and numbness or tingling of feet  Psychiatric: negative  Hematologic/Lymphatic/Immunologic: negative    Past Medical History  Past Medical History:   Diagnosis Date    Anxiety     Congenital heart disorder     Depressive disorder     Diabetes (H)     Pyelonephritis     June 2021, 2 ER visits       Medications  Current Outpatient Medications   Medication Sig Dispense Refill    Acetone, Urine, Test (KETONE TEST) STRP 1 each as needed (Concerning elevated ketone) 50 strip 0    alcohol swab prep pads Use to  swab area of injection/connor as directed. 100 each 0    blood glucose (NO BRAND SPECIFIED) test strip Use to test blood sugar 4 times daily or as directed. AccuChek Guide me strip. 100 strip 0    blood glucose calibration (NO BRAND SPECIFIED) solution To accompany: AccuChek Guide Me Glucose meter 1 each 0    blood glucose monitoring (NO BRAND SPECIFIED) meter device kit Use to test blood sugar 4 times daily or as directed. Preferred blood glucose meter OR supplies to accompany: Accuchek Guide me glucose meter. 1 kit 0    Continuous Blood Gluc Sensor (FREESTYLE TRISHA 3 SENSOR) MISC 1 each every 14 days 2 each 6    glucagon 1 MG kit Use as directed in case of low blood sugar resulting in seizure or unconsciousness      insulin aspart (NOVOLOG PEN) 100 UNIT/ML pen Inject 20 Units Subcutaneous 3 times daily (with meals) 1 unit per 10 carbs 60 mL 1    insulin glargine (LANTUS PEN) 100 UNIT/ML pen Inject 30 Units Subcutaneous every morning 30 mL 1    insulin pen needle (32G X 4 MM) 32G X 4 MM miscellaneous Inject Subcutaneous 4 times daily Use 4 pen needles daily or as directed. 400 each 1    multivitamin, therapeutic (THERA-VIT) TABS tablet Take 1 tablet by mouth daily      thin (NO BRAND SPECIFIED) lancets 1 each 4 times daily (before meals and nightly) Use with lanceting device. To accompany: Blood Glucose Monitor Brands: Accu Chek Guide Me meter 100 each 0    etonogestrel (NEXPLANON) 68 MG IMPL 1 each by Subdermal route once (Patient not taking: Reported on 2/9/2024)         Allergies  Allergies   Allergen Reactions    Ketorolac Difficulty breathing         Family History  family history includes Alcoholism in her father and mother; Anxiety Disorder in her father and mother; Depression in her father and mother; Multiple Sclerosis in her mother; No Known Problems in her sister.    Social History  Social History     Tobacco Use    Smoking status: Never     Passive exposure: Never    Smokeless tobacco: Never   Vaping Use  "   Vaping status: Every Day   Substance Use Topics    Alcohol use: Yes     Comment: occasional    Drug use: Never       Physical Exam  /76 (BP Location: Left arm, Patient Position: Sitting, Cuff Size: Adult Regular)   Pulse 93   Resp 16   Ht 1.651 m (5' 5\")   Wt 63.4 kg (139 lb 12.8 oz)   SpO2 100%   BMI 23.26 kg/m    Body mass index is 23.26 kg/m .  GENERAL :  In no apparent distress  SKIN: Normal color, normal temperature, texture.  No hirsutism, alopecia or purple striae.     EYES: PERRL, EOMI, No scleral icterus,  No proptosis, conjunctival redness, stare, retraction  RESP: Lungs clear to auscultation bilaterally  CARDIAC: Regular rate and rhythm, normal S1 S2, without murmurs, rubs or gallops  NEURO: awake, alert, responds appropriately to questions.  Cranial nerves intact.   Moves all extremities; Gait normal.  No tremor of the outstretched hand.    EXTREMITIES: No clubbing, cyanosis or edema.    DATA REVIEW  FreeStyle LibreView  Time in target range 23%  High 27%, Very High 50%  Current Ave  mg/dl      "

## 2024-06-20 NOTE — NURSING NOTE
Kimberly Patterson's goals for this visit include:   Chief Complaint   Patient presents with    Follow Up     DMI       She requests these members of her care team be copied on today's visit information: yes    PCP: Emily Moyer    Referring Provider:  Referred Self, MD  No address on file    /76 (BP Location: Left arm, Patient Position: Sitting, Cuff Size: Adult Regular)   Pulse 93   Resp 16   Wt 63.4 kg (139 lb 12.8 oz)   SpO2 100%   BMI 23.26 kg/m      Do you need any medication refills at today's visit? None    Sagar Wilson, EMT

## 2024-06-20 NOTE — PATIENT INSTRUCTIONS
Welcome to the [facility] Endocrinology and Diabetes Clinics     Our Endocrinology Clinics are here to provide you with a team-based, collaborative approach in the diagnosis and treatment of patients with diabetes and endocrine disorders. The team is made up of Physicians, Physician Assistants, Certified Diabetes Educators, Registered Nurses, Medical Assistants, Emergency Medical Technicians, and many others, all of whom have the unified goal of providing our patients with high quality care.     Please see below for some helpful tips to best navigate and use the [facility] Endocrinology clinic:     [place] Respect: At [facility], we are committed to a respectful and safe space for all patients, visitors, and staff.  We believe that mutual respect between patients and their care team is the foundation of quality care.  It is our expectation that you will be treated with respect by your care team.  In turn, we ask that all communication with the care team (written and verbal) be respectful and free from profanity, threatening, or abusive language.  Disrespectful communication undermines our therapeutic relationship with you and may result in us being unable to continue to provide your care.    Refills: A provider must see you at least annually to prescribe and refill medications. This is to ensure your safety as well as meet insurance and compliance regulations.    Scheduling: Many of our Providers offer both in-person or video visits. Please call to schedule any needed follow ups as soon as possible because our provider schedules fill up very quickly. Our care team has the right to require an in-person visit when they believe that it is medically necessary. Please remember that for any virtual visits, you must be in the [facility] at the time of the visit, otherwise we are unable to see you and you will need to be rescheduled.    Missed Appointments: If you need to cancel or miss your  scheduled appointment, please call the clinic at [phone] to reschedule.  Please note if you repeatedly miss appointments or repeatedly miss appointments without calling to inform us ahead of time (no-show), the clinic may elect to not allow you to reschedule without speaking to a manager, may require a Partnership In Care Agreement prior to rescheduling, or could result in you no longer being able to receive care from the clinic. Providing the clinic with timely notification if you have to miss an appointment, allows us to better serve the needs of all of our patients.    Primary Care Provider: Our Endocrinologists are Specialists in their field. We expect you to have a Primary Care Provider established to handle any needs outside of your diabetes and endocrine care.  We would be happy to assist you find a Primary Care Provider, if you do not have one.    [employer]: [employer] is a wonderful resource that allows you access to your Care Team via online or the jenny. Please ask a member of the team if you would like help creating an account. Please note that it may take up to 2 business days for a response. [employer] messages are not reviewed on weekends or after business hours.  Emergent or urgent care needs should never be communicated via [employer].  If you experience a medical emergency call 911 or go to the nearest emergency room.    Labs: It is recommended that you stay within the [facility] for labs but you are welcome to obtain ordered labs (with some exceptions) from any location of your choice as long as they are able to complete and process the needed labs. If you need us to fax orders to your preferred lab, please provide us the name and fax number of the lab you would like to go to so we can fax the orders. If your labs are drawn outside of the [facility], please have them fax the results to [phone] ([place]) or [phone] ([place]) or via [facility]. It is your  responsibility to ensure that outside lab results are sent to us.    We look forward to working with you. Please do not hesitate to reach out with any questions.    Thank you,    The Endocrine Team    [facility] Address:   [place] Address:     [address] [place] [address]    Phone: [phone]  Fax: [phone] [address] [place] [address]    Phone: [phone]  Fax: [phone]     [facility] Cost Estimate Phone Number: [phone]    General Lab and Imaging Scheduling Phone Number: [phone]

## 2024-06-21 LAB
CREAT UR-MCNC: 122 MG/DL
MICROALBUMIN UR-MCNC: <12 MG/L
MICROALBUMIN/CREAT UR: NORMAL MG/G{CREAT}

## 2024-09-09 ENCOUNTER — MYC MEDICAL ADVICE (OUTPATIENT)
Dept: ENDOCRINOLOGY | Facility: CLINIC | Age: 24
End: 2024-09-09
Payer: COMMERCIAL

## 2024-09-09 DIAGNOSIS — E10.65 TYPE 1 DIABETES MELLITUS WITH HYPERGLYCEMIA (H): Primary | ICD-10-CM

## 2024-09-09 RX ORDER — BLOOD-GLUCOSE SENSOR
EACH MISCELLANEOUS
Qty: 6 EACH | Refills: 2 | Status: SHIPPED | OUTPATIENT
Start: 2024-09-09

## 2024-10-25 ENCOUNTER — OFFICE VISIT (OUTPATIENT)
Dept: ENDOCRINOLOGY | Facility: CLINIC | Age: 24
End: 2024-10-25
Payer: COMMERCIAL

## 2024-10-25 VITALS
DIASTOLIC BLOOD PRESSURE: 87 MMHG | SYSTOLIC BLOOD PRESSURE: 122 MMHG | HEART RATE: 72 BPM | RESPIRATION RATE: 16 BRPM | OXYGEN SATURATION: 100 %

## 2024-10-25 DIAGNOSIS — E10.65 TYPE 1 DIABETES MELLITUS WITH HYPERGLYCEMIA (H): Primary | ICD-10-CM

## 2024-10-25 LAB
EST. AVERAGE GLUCOSE BLD GHB EST-MCNC: 174 MG/DL
HBA1C MFR BLD: 7.7 %

## 2024-10-25 PROCEDURE — 99214 OFFICE O/P EST MOD 30 MIN: CPT | Performed by: PHYSICIAN ASSISTANT

## 2024-10-25 PROCEDURE — 83036 HEMOGLOBIN GLYCOSYLATED A1C: CPT | Performed by: PHYSICIAN ASSISTANT

## 2024-10-25 NOTE — PROGRESS NOTES
Assessment/Plan :   Type 1 DM. Kimberly has been working hard to get her blood sugars under control and she is doing well. She is still having more lows than I would like, but her hemoglobin A1C has improved significantly. We reviewed her CGMS report and we discussed the importance of taking insulin aspart as close to meal time, as possible. She will occasionally bolus 1-2 hrs after the meal. This typically leads to a significant drop in her blood sugar. She also admits that she often does not eat breakfast, and she will experience a low blood sugar before lunch. She needs to eat something for breakfast. This will help with the mid-day drop. Kimberly is also considering going back on an insulin pump. I do think this will help improve her overall control. For now, we will continue with MDI therapy. When she decides that she wants to restart the insulin pump, we will place a CDE referral. We will plan on a follow-up appt in 4 mos.       I have independently reviewed and interpreted labs, imaging as indicated.      Chief complaint:  Kimberly is a 24 year old female who returns for follow-up of Type 1 DM.    I have reviewed Care Everywhere including [facility], [facility], [facility], [place], [facility], [facility] , [facility], [place] lab reports, imaging reports and provider notes as indicated.      HISTORY OF PRESENT ILLNESS  Kimberly is doing well. She was excited to hear that her hemoglobin A1C was 7.7%. She doesn't know if it has ever been that low. She has continued to manage her blood sugars with MDI therapy. She is currently taking from 21-24 unit(s) of Lantus every morning. She adjusts the dose based on her morning blood sugar. She also continues to take insulin aspart based on an insulin to carb ratio of 1 unit(s) for every 10 grams of carbs. She tries to take this before meals but admits she often takes it after the meal.    Kimberly has been monitoring her blood sugars with the FreeStyle Kathe 3 sensor. She has  not had any problems with severe hyperglycemia and/or hypoglycemia. She was struggling with overnight lows, so that is why she decided to decrease Lantus from 28 unit(s) down to 21-24 unit(s). Kimberly also has not had any problems with blurred vision or an increase in numbness/tingling in her feet. She is feeling good. She is under a lot of stress but she feels like she is managing things well.    Kimberly was diagnosed with diabetes at the age of 9. She has struggled with glucose control since diagnosis. She states that her lowest hemoglobin A1C was around 8% when she was a teenager. She has used both MDI insulin therapy and insulin pump therapy, in the past. She feels like she had better control when she was using the Medtronic insulin pump but she didn't like the fact that something was attached to her. She also did not like the Guardian sensor. She then had issues with the DME, so she decided to switch back to MDI therapy in 2023.  Her diabetes is complicated by a ventricular septal defect.    Endocrine relevant labs are as follows:   Latest Reference Range & Units 10/25/24 14:31   Afinion Hemoglobin A1c POCT <=5.7 % 7.7 (H)   (H): Data is abnormally high   Latest Reference Range & Units 06/20/24 12:30   Afinion Hemoglobin A1c POCT <=5.7 % 11.0 (H)   (H): Data is abnormally high   Latest Reference Range & Units 06/20/24 13:06   Albumin Urine mg/L mg/L <12.0     REVIEW OF SYSTEMS    Endocrine: positive for diabetes  Skin: negative  Eyes: negative for, visual blurring, redness, tearing  Ears/Nose/Throat: negative  Respiratory: No shortness of breath, dyspnea on exertion, cough, or hemoptysis  Cardiovascular: negative for, chest pain, dyspnea on exertion, lower extremity edema, and exercise intolerance  Gastrointestinal: negative for, nausea, vomiting, constipation, and diarrhea  Genitourinary: negative for, nocturia, dysuria, frequency, and urgency  Musculoskeletal: negative for, muscular weakness, nocturnal cramping,  and foot pain  Neurologic: negative for, local weakness, numbness or tingling of hands, and numbness or tingling of feet  Psychiatric: negative  Hematologic/Lymphatic/Immunologic: negative    Past Medical History  Past Medical History:   Diagnosis Date    Anxiety     Congenital heart disorder     Depressive disorder     Diabetes (H)     Pyelonephritis     June 2021, 2 ER visits       Medications  Current Outpatient Medications   Medication Sig Dispense Refill    Acetone, Urine, Test (KETONE TEST) STRP 1 each as needed (Concerning elevated ketone) 50 strip 0    alcohol swab prep pads Use to swab area of injection/connor as directed. 100 each 0    blood glucose (NO BRAND SPECIFIED) test strip Use to test blood sugar 4 times daily or as directed. AccuChek Guide me strip. 100 strip 0    blood glucose calibration (NO BRAND SPECIFIED) solution To accompany: AccuChek Guide Me Glucose meter 1 each 0    blood glucose monitoring (NO BRAND SPECIFIED) meter device kit Use to test blood sugar 4 times daily or as directed. Preferred blood glucose meter OR supplies to accompany: Accuchek Guide me glucose meter. 1 kit 0    Continuous Blood Gluc Sensor (FREESTYLE TRISHA 3 SENSOR) MISC 1 each every 14 days 2 each 6    Continuous Glucose Sensor (FREESTYLE TRISHA 3 PLUS SENSOR) MISC Change every 15 days. 6 each 2    glucagon 1 MG kit Use as directed in case of low blood sugar resulting in seizure or unconsciousness      insulin aspart (NOVOLOG PEN) 100 UNIT/ML pen Inject 20 Units Subcutaneous 3 times daily (with meals) 1 unit per 10 carbs 60 mL 1    insulin glargine (LANTUS PEN) 100 UNIT/ML pen Inject 30 Units Subcutaneous every morning 30 mL 1    insulin pen needle (32G X 4 MM) 32G X 4 MM miscellaneous Inject Subcutaneous 4 times daily Use 4 pen needles daily or as directed. 400 each 1    multivitamin, therapeutic (THERA-VIT) TABS tablet Take 1 tablet by mouth daily      thin (NO BRAND SPECIFIED) lancets 1 each 4 times daily (before  meals and nightly) Use with lanceting device. To accompany: Blood Glucose Monitor Brands: Accu Chek Guide Me meter 100 each 0    etonogestrel (NEXPLANON) 68 MG IMPL 1 each by Subdermal route once (Patient not taking: Reported on 2/9/2024)         Allergies  Allergies   Allergen Reactions    Ketorolac Difficulty breathing         Family History  family history includes Alcoholism in her father and mother; Anxiety Disorder in her father and mother; Depression in her father and mother; Multiple Sclerosis in her mother; No Known Problems in her sister.    Social History  Social History     Tobacco Use    Smoking status: Never     Passive exposure: Never    Smokeless tobacco: Never   Vaping Use    Vaping status: Every Day   Substance Use Topics    Alcohol use: Yes     Comment: occasional    Drug use: Never       Physical Exam  /87   Pulse 72   Resp 16   SpO2 100%   There is no height or weight on file to calculate BMI.  GENERAL :  In no apparent distress. She is accompanied by her Reji patel.  SKIN: Normal color, normal temperature, texture.  No hirsutism, alopecia or purple striae.     EYES: PERRL, EOMI, No scleral icterus,  No proptosis, conjunctival redness, stare, retraction  RESP: Lungs clear to auscultation bilaterally  CARDIAC: Regular rate and rhythm, normal S1 S2, without murmurs, rubs or gallops    NEURO: awake, alert, responds appropriately to questions.  Cranial nerves intact.   Moves all extremities; Gait normal.  No tremor of the outstretched hand.    EXTREMITIES: No clubbing, cyanosis or edema.    DATA REVIEW  FreeStyle LibreView  Time in target range 45%  Very Low 1%, Low 8%  High 25%, Very High 21%  Current Ave  mg/dl

## 2024-10-25 NOTE — LETTER
10/25/2024      Kimberly Patterson  [id number] [facility] [address]  [facility] [address]      Dear Colleague,    Thank you for referring your patient, Kimberly Patterson, to the [facility]. Please see a copy of my visit note below.          Assessment/Plan :   Type 1 DM. Kimberly has been working hard to get her blood sugars under control and she is doing well. She is still having more lows than I would like, but her hemoglobin A1C has improved significantly. We reviewed her CGMS report and we discussed the importance of taking insulin aspart as close to meal time, as possible. She will occasionally bolus 1-2 hrs after the meal. This typically leads to a significant drop in her blood sugar. She also admits that she often does not eat breakfast, and she will experience a low blood sugar before lunch. She needs to eat something for breakfast. This will help with the mid-day drop. Kimberly is also considering going back on an insulin pump. I do think this will help improve her overall control. For now, we will continue with MDI therapy. When she decides that she wants to restart the insulin pump, we will place a CDE referral. We will plan on a follow-up appt in 4 mos.       I have independently reviewed and interpreted labs, imaging as indicated.      Chief complaint:  Kimberly is a 24 year old female who returns for follow-up of Type 1 DM.    I have reviewed Care Everywhere including [facility], [facility], [facility], [facility], [place], [facility], [facility] , [facility], [place] lab reports, imaging reports and provider notes as indicated.      HISTORY OF PRESENT ILLNESS  Kimberly is doing well. She was excited to hear that her hemoglobin A1C was 7.7%. She doesn't know if it has ever been that low. She has continued to manage her blood sugars with MDI therapy. She is currently taking from 21-24 unit(s) of Lantus every morning. She adjusts the dose based on her morning blood sugar. She also continues to take insulin aspart  based on an insulin to carb ratio of 1 unit(s) for every 10 grams of carbs. She tries to take this before meals but admits she often takes it after the meal.    Kimberly has been monitoring her blood sugars with the FreeStyle Kathe 3 sensor. She has not had any problems with severe hyperglycemia and/or hypoglycemia. She was struggling with overnight lows, so that is why she decided to decrease Lantus from 28 unit(s) down to 21-24 unit(s). Kimberly also has not had any problems with blurred vision or an increase in numbness/tingling in her feet. She is feeling good. She is under a lot of stress but she feels like she is managing things well.    Kimberly was diagnosed with diabetes at the age of 9. She has struggled with glucose control since diagnosis. She states that her lowest hemoglobin A1C was around 8% when she was a teenager. She has used both MDI insulin therapy and insulin pump therapy, in the past. She feels like she had better control when she was using the Medtronic insulin pump but she didn't like the fact that something was attached to her. She also did not like the Guardian sensor. She then had issues with the DME, so she decided to switch back to MDI therapy in 2023.  Her diabetes is complicated by a ventricular septal defect.    Endocrine relevant labs are as follows:   Latest Reference Range & Units 10/25/24 14:31   Afinion Hemoglobin A1c POCT <=5.7 % 7.7 (H)   (H): Data is abnormally high   Latest Reference Range & Units 06/20/24 12:30   Afinion Hemoglobin A1c POCT <=5.7 % 11.0 (H)   (H): Data is abnormally high   Latest Reference Range & Units 06/20/24 13:06   Albumin Urine mg/L mg/L <12.0     REVIEW OF SYSTEMS    Endocrine: positive for diabetes  Skin: negative  Eyes: negative for, visual blurring, redness, tearing  Ears/Nose/Throat: negative  Respiratory: No shortness of breath, dyspnea on exertion, cough, or hemoptysis  Cardiovascular: negative for, chest pain, dyspnea on exertion, lower extremity edema,  and exercise intolerance  Gastrointestinal: negative for, nausea, vomiting, constipation, and diarrhea  Genitourinary: negative for, nocturia, dysuria, frequency, and urgency  Musculoskeletal: negative for, muscular weakness, nocturnal cramping, and foot pain  Neurologic: negative for, local weakness, numbness or tingling of hands, and numbness or tingling of feet  Psychiatric: negative  Hematologic/Lymphatic/Immunologic: negative    Past Medical History  Past Medical History:   Diagnosis Date     Anxiety      Congenital heart disorder      Depressive disorder      Diabetes (H)      Pyelonephritis     June 2021, 2 ER visits       Medications  Current Outpatient Medications   Medication Sig Dispense Refill     Acetone, Urine, Test (KETONE TEST) STRP 1 each as needed (Concerning elevated ketone) 50 strip 0     alcohol swab prep pads Use to swab area of injection/connor as directed. 100 each 0     blood glucose (NO BRAND SPECIFIED) test strip Use to test blood sugar 4 times daily or as directed. AccuChek Guide me strip. 100 strip 0     blood glucose calibration (NO BRAND SPECIFIED) solution To accompany: AccuChek Guide Me Glucose meter 1 each 0     blood glucose monitoring (NO BRAND SPECIFIED) meter device kit Use to test blood sugar 4 times daily or as directed. Preferred blood glucose meter OR supplies to accompany: Accuchek Guide me glucose meter. 1 kit 0     Continuous Blood Gluc Sensor (FREESTYLE TRISHA 3 SENSOR) MISC 1 each every 14 days 2 each 6     Continuous Glucose Sensor (FREESTYLE TRISHA 3 PLUS SENSOR) MISC Change every 15 days. 6 each 2     glucagon 1 MG kit Use as directed in case of low blood sugar resulting in seizure or unconsciousness       insulin aspart (NOVOLOG PEN) 100 UNIT/ML pen Inject 20 Units Subcutaneous 3 times daily (with meals) 1 unit per 10 carbs 60 mL 1     insulin glargine (LANTUS PEN) 100 UNIT/ML pen Inject 30 Units Subcutaneous every morning 30 mL 1     insulin pen needle (32G X 4 MM)  32G X 4 MM miscellaneous Inject Subcutaneous 4 times daily Use 4 pen needles daily or as directed. 400 each 1     multivitamin, therapeutic (THERA-VIT) TABS tablet Take 1 tablet by mouth daily       thin (NO BRAND SPECIFIED) lancets 1 each 4 times daily (before meals and nightly) Use with lanceting device. To accompany: Blood Glucose Monitor Brands: Accu Chek Guide Me meter 100 each 0     etonogestrel (NEXPLANON) 68 MG IMPL 1 each by Subdermal route once (Patient not taking: Reported on 2/9/2024)         Allergies  Allergies   Allergen Reactions     Ketorolac Difficulty breathing         Family History  family history includes Alcoholism in her father and mother; Anxiety Disorder in her father and mother; Depression in her father and mother; Multiple Sclerosis in her mother; No Known Problems in her sister.    Social History  Social History     Tobacco Use     Smoking status: Never     Passive exposure: Never     Smokeless tobacco: Never   Vaping Use     Vaping status: Every Day   Substance Use Topics     Alcohol use: Yes     Comment: occasional     Drug use: Never       Physical Exam  /87   Pulse 72   Resp 16   SpO2 100%   There is no height or weight on file to calculate BMI.  GENERAL :  In no apparent distress. She is accompanied by her Reji patel.  SKIN: Normal color, normal temperature, texture.  No hirsutism, alopecia or purple striae.     EYES: PERRL, EOMI, No scleral icterus,  No proptosis, conjunctival redness, stare, retraction  RESP: Lungs clear to auscultation bilaterally  CARDIAC: Regular rate and rhythm, normal S1 S2, without murmurs, rubs or gallops    NEURO: awake, alert, responds appropriately to questions.  Cranial nerves intact.   Moves all extremities; Gait normal.  No tremor of the outstretched hand.    EXTREMITIES: No clubbing, cyanosis or edema.    DATA REVIEW  FreeStyle LibreView  Time in target range 45%  Very Low 1%, Low 8%  High 25%, Very High 21%  Current Ave  mg/dl          Again, thank you for allowing me to participate in the care of your patient.        Sincerely,        Nanette Sykes PA-C

## 2024-10-25 NOTE — NURSING NOTE
Kimberly Patterson's goals for this visit include:   Chief Complaint   Patient presents with    Follow Up    Diabetes     DM       She requests these members of her care team be copied on today's visit information: yes    PCP: Emily Moyer    Referring Provider:  Referred Self, MD  No address on file    /87   Pulse 72   Resp 16   SpO2 100%     Do you need any medication refills at today's visit? None    Sagar Wilson, EMT

## 2024-10-25 NOTE — PATIENT INSTRUCTIONS
Welcome to the [facility] Endocrinology and Diabetes Clinics     Our Endocrinology Clinics are here to provide you with a team-based, collaborative approach in the diagnosis and treatment of patients with diabetes and endocrine disorders. The team is made up of Physicians, Physician Assistants, Certified Diabetes Educators, Registered Nurses, Medical Assistants, Emergency Medical Technicians, and many others, all of whom have the unified goal of providing our patients with high quality care.     Please see below for some helpful tips to best navigate and use the [facility] Endocrinology clinic:     [place] Respect: At [facility], we are committed to a respectful and safe space for all patients, visitors, and staff.  We believe that mutual respect between patients and their care team is the foundation of quality care.  It is our expectation that you will be treated with respect by your care team.  In turn, we ask that all communication with the care team (written and verbal) be respectful and free from profanity, threatening, or abusive language.  Disrespectful communication undermines our therapeutic relationship with you and may result in us being unable to continue to provide your care.    Refills: A provider must see you at least annually to prescribe and refill medications. This is to ensure your safety as well as meet insurance and compliance regulations.    Scheduling: Many of our Providers offer both in-person or video visits. Please call to schedule any needed follow ups as soon as possible because our provider schedules fill up very quickly. Our care team has the right to require an in-person visit when they believe that it is medically necessary. Please remember that for any virtual visits, you must be in the [facility] at the time of the visit, otherwise we are unable to see you and you will need to be rescheduled.    Missed Appointments: If you need to cancel or miss your  scheduled appointment, please call the clinic at [phone] to reschedule.  Please note if you repeatedly miss appointments or repeatedly miss appointments without calling to inform us ahead of time (no-show), the clinic may elect to not allow you to reschedule without speaking to a manager, may require a Partnership In Care Agreement prior to rescheduling, or could result in you no longer being able to receive care from the clinic. Providing the clinic with timely notification if you have to miss an appointment, allows us to better serve the needs of all of our patients.    Primary Care Provider: Our Endocrinologists are Specialists in their field. We expect you to have a Primary Care Provider established to handle any needs outside of your diabetes and endocrine care.  We would be happy to assist you find a Primary Care Provider, if you do not have one.    Liquipel: Liquipel is a wonderful resource that allows you access to your Care Team via online or the jenny. Please ask a member of the team if you would like help creating an account. Please note that it may take up to 2 business days for a response. Liquipel messages are not reviewed on weekends or after business hours.  Emergent or urgent care needs should never be communicated via Liquipel.  If you experience a medical emergency call 911 or go to the nearest emergency room.    Labs: It is recommended that you stay within the [facility] for labs but you are welcome to obtain ordered labs (with some exceptions) from any location of your choice as long as they are able to complete and process the needed labs. If you need us to fax orders to your preferred lab, please provide us the name and fax number of the lab you would like to go to so we can fax the orders. If your labs are drawn outside of the [facility], please have them fax the results to [phone] ([place]) or [phone] ([place]) or via [facility] ([facility]). It is your  responsibility to ensure that outside lab results are sent to us.    We look forward to working with you. Please do not hesitate to reach out with any questions.    Thank you,    The Endocrine Team    [facility] Address:   [place] Address:     [address] [place] [address]    Phone: [phone]  Fax: [phone] [address] [place] [address]    Phone: [phone]  Fax: [phone]     [facility] Cost Estimate Phone Number: [phone]    General Lab and Imaging Scheduling Phone Number: [phone]

## 2024-11-09 ENCOUNTER — HEALTH MAINTENANCE LETTER (OUTPATIENT)
Age: 24
End: 2024-11-09

## 2025-02-02 ENCOUNTER — HEALTH MAINTENANCE LETTER (OUTPATIENT)
Age: 25
End: 2025-02-02

## 2025-04-29 ENCOUNTER — OFFICE VISIT (OUTPATIENT)
Dept: OBGYN | Facility: CLINIC | Age: 25
End: 2025-04-29
Payer: COMMERCIAL

## 2025-04-29 VITALS
SYSTOLIC BLOOD PRESSURE: 115 MMHG | RESPIRATION RATE: 16 BRPM | BODY MASS INDEX: 22.94 KG/M2 | WEIGHT: 137.7 LBS | HEART RATE: 92 BPM | TEMPERATURE: 98.1 F | HEIGHT: 65 IN | DIASTOLIC BLOOD PRESSURE: 75 MMHG

## 2025-04-29 DIAGNOSIS — Z23 NEED FOR TDAP VACCINATION: ICD-10-CM

## 2025-04-29 DIAGNOSIS — M62.89 PELVIC FLOOR DYSFUNCTION: ICD-10-CM

## 2025-04-29 DIAGNOSIS — Z31.69 PRE-CONCEPTION COUNSELING: ICD-10-CM

## 2025-04-29 DIAGNOSIS — N92.6 IRREGULAR PERIODS: ICD-10-CM

## 2025-04-29 DIAGNOSIS — Z12.4 CERVICAL CANCER SCREENING: ICD-10-CM

## 2025-04-29 DIAGNOSIS — Z00.00 ANNUAL WELLNESS VISIT: Primary | ICD-10-CM

## 2025-04-29 DIAGNOSIS — Z11.3 SCREEN FOR STD (SEXUALLY TRANSMITTED DISEASE): ICD-10-CM

## 2025-04-29 LAB
ALBUMIN SERPL BCG-MCNC: 4.7 G/DL (ref 3.5–5.2)
ALP SERPL-CCNC: 97 U/L (ref 40–150)
ALT SERPL W P-5'-P-CCNC: 9 U/L (ref 0–50)
ANION GAP SERPL CALCULATED.3IONS-SCNC: 11 MMOL/L (ref 7–15)
AST SERPL W P-5'-P-CCNC: 19 U/L (ref 0–45)
BILIRUB SERPL-MCNC: 0.4 MG/DL
BUN SERPL-MCNC: 10.6 MG/DL (ref 6–20)
CALCIUM SERPL-MCNC: 9.8 MG/DL (ref 8.8–10.4)
CHLORIDE SERPL-SCNC: 99 MMOL/L (ref 98–107)
CREAT SERPL-MCNC: 0.66 MG/DL (ref 0.51–0.95)
EGFRCR SERPLBLD CKD-EPI 2021: >90 ML/MIN/1.73M2
ERYTHROCYTE [DISTWIDTH] IN BLOOD BY AUTOMATED COUNT: 12.7 % (ref 10–15)
EST. AVERAGE GLUCOSE BLD GHB EST-MCNC: 206 MG/DL
ESTRADIOL SERPL-MCNC: 37 PG/ML
FSH SERPL IRP2-ACNC: 6.1 MIU/ML
GLUCOSE SERPL-MCNC: 119 MG/DL (ref 70–99)
HBA1C MFR BLD: 8.8 % (ref 0–5.6)
HCO3 SERPL-SCNC: 25 MMOL/L (ref 22–29)
HCT VFR BLD AUTO: 36.7 % (ref 35–47)
HGB BLD-MCNC: 12.6 G/DL (ref 11.7–15.7)
MCH RBC QN AUTO: 28.8 PG (ref 26.5–33)
MCHC RBC AUTO-ENTMCNC: 34.3 G/DL (ref 31.5–36.5)
MCV RBC AUTO: 84 FL (ref 78–100)
MIS SERPL-MCNC: 5.4 NG/ML (ref 1.2–12)
PLATELET # BLD AUTO: 369 10E3/UL (ref 150–450)
POTASSIUM SERPL-SCNC: 3.6 MMOL/L (ref 3.4–5.3)
PROT SERPL-MCNC: 8.1 G/DL (ref 6.4–8.3)
RBC # BLD AUTO: 4.38 10E6/UL (ref 3.8–5.2)
SHBG SERPL-SCNC: 97 NMOL/L (ref 30–135)
SODIUM SERPL-SCNC: 135 MMOL/L (ref 135–145)
TSH SERPL DL<=0.005 MIU/L-ACNC: 1.19 UIU/ML (ref 0.3–4.2)
WBC # BLD AUTO: 8.1 10E3/UL (ref 4–11)

## 2025-04-29 PROCEDURE — 86762 RUBELLA ANTIBODY: CPT | Performed by: OBSTETRICS & GYNECOLOGY

## 2025-04-29 PROCEDURE — 84403 ASSAY OF TOTAL TESTOSTERONE: CPT | Performed by: OBSTETRICS & GYNECOLOGY

## 2025-04-29 PROCEDURE — 83036 HEMOGLOBIN GLYCOSYLATED A1C: CPT | Performed by: OBSTETRICS & GYNECOLOGY

## 2025-04-29 PROCEDURE — G0145 SCR C/V CYTO,THINLAYER,RESCR: HCPCS | Performed by: OBSTETRICS & GYNECOLOGY

## 2025-04-29 PROCEDURE — 87591 N.GONORRHOEAE DNA AMP PROB: CPT | Performed by: OBSTETRICS & GYNECOLOGY

## 2025-04-29 PROCEDURE — 3074F SYST BP LT 130 MM HG: CPT | Performed by: OBSTETRICS & GYNECOLOGY

## 2025-04-29 PROCEDURE — 99213 OFFICE O/P EST LOW 20 MIN: CPT | Mod: 25 | Performed by: OBSTETRICS & GYNECOLOGY

## 2025-04-29 PROCEDURE — 36415 COLL VENOUS BLD VENIPUNCTURE: CPT | Performed by: OBSTETRICS & GYNECOLOGY

## 2025-04-29 PROCEDURE — 87491 CHLMYD TRACH DNA AMP PROBE: CPT | Performed by: OBSTETRICS & GYNECOLOGY

## 2025-04-29 PROCEDURE — 83001 ASSAY OF GONADOTROPIN (FSH): CPT | Performed by: OBSTETRICS & GYNECOLOGY

## 2025-04-29 PROCEDURE — 3078F DIAST BP <80 MM HG: CPT | Performed by: OBSTETRICS & GYNECOLOGY

## 2025-04-29 PROCEDURE — 84270 ASSAY OF SEX HORMONE GLOBUL: CPT | Performed by: OBSTETRICS & GYNECOLOGY

## 2025-04-29 PROCEDURE — 99385 PREV VISIT NEW AGE 18-39: CPT | Mod: 25 | Performed by: OBSTETRICS & GYNECOLOGY

## 2025-04-29 PROCEDURE — 90471 IMMUNIZATION ADMIN: CPT | Performed by: OBSTETRICS & GYNECOLOGY

## 2025-04-29 PROCEDURE — 99459 PELVIC EXAMINATION: CPT | Performed by: OBSTETRICS & GYNECOLOGY

## 2025-04-29 PROCEDURE — 84443 ASSAY THYROID STIM HORMONE: CPT | Performed by: OBSTETRICS & GYNECOLOGY

## 2025-04-29 PROCEDURE — 86787 VARICELLA-ZOSTER ANTIBODY: CPT | Performed by: OBSTETRICS & GYNECOLOGY

## 2025-04-29 PROCEDURE — 85027 COMPLETE CBC AUTOMATED: CPT | Performed by: OBSTETRICS & GYNECOLOGY

## 2025-04-29 PROCEDURE — 90715 TDAP VACCINE 7 YRS/> IM: CPT | Performed by: OBSTETRICS & GYNECOLOGY

## 2025-04-29 PROCEDURE — 82670 ASSAY OF TOTAL ESTRADIOL: CPT | Performed by: OBSTETRICS & GYNECOLOGY

## 2025-04-29 PROCEDURE — 80053 COMPREHEN METABOLIC PANEL: CPT | Performed by: OBSTETRICS & GYNECOLOGY

## 2025-04-29 PROCEDURE — 82166 ASSAY ANTI-MULLERIAN HORM: CPT | Performed by: OBSTETRICS & GYNECOLOGY

## 2025-04-29 ASSESSMENT — PATIENT HEALTH QUESTIONNAIRE - PHQ9
SUM OF ALL RESPONSES TO PHQ QUESTIONS 1-9: 5
SUM OF ALL RESPONSES TO PHQ QUESTIONS 1-9: 5
10. IF YOU CHECKED OFF ANY PROBLEMS, HOW DIFFICULT HAVE THESE PROBLEMS MADE IT FOR YOU TO DO YOUR WORK, TAKE CARE OF THINGS AT HOME, OR GET ALONG WITH OTHER PEOPLE: SOMEWHAT DIFFICULT

## 2025-04-29 NOTE — PROGRESS NOTES
Kimberly is a 25 year old  female who presents for annual exam.     Menses are regular q 28-30 days and normal lasting 5 days.  Menses flow: heavy.  Patient's last menstrual period was 2025 (exact date).. Using none for contraception.  She is currently considering pregnancy.  Besides routine health maintenance,  she would like to discuss concerns about her hormone levels. She has noticed some variation in her cycles and keeps track of them but doesn't have that information handy today. She does get a period every month but sometimes it's a week later than expected. She denies concerns for acne or hirsutism. They are not actively trying but also not preventing pregnancy but would like to talk today about optimizing her health prior to conception. She has not yet started a PNV and is next due to see her endocrinologist in  but feels like overall her glycemic control has been good.   She also has a history of pelvic floor dysfunction and dyspareunia, she would like a referral to pelvic floor PT.   GYNECOLOGIC HISTORY:  Menarche: 14    Kimberly is sexually active with male partner(s) and is currently in monogamous relationship with .    History sexually transmitted infections:No STD history  STI testing offered?  Declined  LEFTY exposure: Unknown  History of abnormal Pap smear: No - age 21-29 PAP every 3 years recommended  Family history of breast CA: No  Family history of uterine/ovarian CA: No    Family history of colon CA: No    HEALTH MAINTENANCE:  Cholesterol: (  Cholesterol   Date Value Ref Range Status   2024 196 <200 mg/dL Final   2023 216 (H) <200 mg/dL Final   2021 189 <200 mg/dL Final    History of abnormal lipids: Yes  Mammo: No . History of abnormal Mammo: Not applicable.  Regular Self Breast Exams: Yes  Calcium/Vitamin D intake: source:  none Adequate? No  TSH: (  TSH   Date Value Ref Range Status   2025 1.19 0.30 - 4.20 uIU/mL Final   2023 2.03 0.40 - 4.00 mU/L  "Final   2021 3.23 0.40 - 4.00 mU/L Final    )  Pap; (No results found for: \"PAP\" )    HISTORY:  OB History    Para Term  AB Living   0 0 0 0 0 0   SAB IAB Ectopic Multiple Live Births   0 0 0 0 0     Past Medical History:   Diagnosis Date    Anxiety     Congenital heart disorder     VSD    Depressive disorder     Diabetes (H)     Pyelonephritis     2021, 2 ER visits     Past Surgical History:   Procedure Laterality Date    CARDIAC SURGERY      [facility]. Congenital heart condition. Patient reports they looked at her heart but did not repair the VSD    EXAM UNDER ANESTHESIA RECTUM N/A 2022    Procedure: EXAM UNDER ANESTHESIA;  Surgeon: La Lund MD;  Location: [facility] OR    INCISION AND DRAINAGE, ABSCESS, RECTUM N/A 2022    Procedure: , INCISION AND DRAINAGE JAIDEN-RECTAL ABSCESS, DRAIN PLACEMENT;  Surgeon: La Lund MD;  Location: [facility] OR     Family History   Problem Relation Age of Onset    Depression Mother     Anxiety Disorder Mother     Alcoholism Mother     Multiple Sclerosis Mother     Depression Father     Anxiety Disorder Father     Alcoholism Father     No Known Problems Sister     Glaucoma No family hx of     Cancer No family hx of     Diabetes No family hx of     Hypertension No family hx of     Macular Degeneration No family hx of      Social History     Socioeconomic History    Marital status: Single     Spouse name: None    Number of children: None    Years of education: None    Highest education level: None   Tobacco Use    Smoking status: Never     Passive exposure: Never    Smokeless tobacco: Never   Vaping Use    Vaping status: Every Day   Substance and Sexual Activity    Alcohol use: Yes     Comment: occasional    Drug use: Never    Sexual activity: Not Currently     Social Drivers of Health     Interpersonal Safety: Unknown (4/3/2024)    Received from [facility]    Humiliation, Afraid, Rape, and Kick questionnaire     " Physically Abused: No     Sexually Abused: No       Current Outpatient Medications:     Acetone, Urine, Test (KETONE TEST) STRP, 1 each as needed (Concerning elevated ketone), Disp: 50 strip, Rfl: 0    alcohol swab prep pads, Use to swab area of injection/connor as directed., Disp: 100 each, Rfl: 0    blood glucose (NO BRAND SPECIFIED) test strip, Use to test blood sugar 4 times daily or as directed. AccuChek Guide me strip., Disp: 100 strip, Rfl: 0    blood glucose calibration (NO BRAND SPECIFIED) solution, To accompany: AccuChek Guide Me Glucose meter, Disp: 1 each, Rfl: 0    blood glucose monitoring (NO BRAND SPECIFIED) meter device kit, Use to test blood sugar 4 times daily or as directed. Preferred blood glucose meter OR supplies to accompany: Accuchek Guide me glucose meter., Disp: 1 kit, Rfl: 0    Continuous Glucose Sensor (FREESTYLE TRISHA 3 PLUS SENSOR) MISC, Change every 15 days., Disp: 6 each, Rfl: 2    glucagon 1 MG kit, Use as directed in case of low blood sugar resulting in seizure or unconsciousness, Disp: , Rfl:     insulin aspart (NOVOLOG PEN) 100 UNIT/ML pen, Inject 20 Units Subcutaneous 3 times daily (with meals) 1 unit per 10 carbs, Disp: 60 mL, Rfl: 1    insulin glargine (LANTUS PEN) 100 UNIT/ML pen, Inject 30 Units Subcutaneous every morning, Disp: 30 mL, Rfl: 1    insulin pen needle (32G X 4 MM) 32G X 4 MM miscellaneous, Inject Subcutaneous 4 times daily Use 4 pen needles daily or as directed., Disp: 400 each, Rfl: 1    multivitamin, therapeutic (THERA-VIT) TABS tablet, Take 1 tablet by mouth daily, Disp: , Rfl:     thin (NO BRAND SPECIFIED) lancets, 1 each 4 times daily (before meals and nightly) Use with lanceting device. To accompany: Blood Glucose Monitor Brands: Accu Chek Guide Me meter, Disp: 100 each, Rfl: 0     Allergies   Allergen Reactions    Ketorolac Difficulty breathing       Past medical, surgical, social and family history were reviewed and updated in EPIC.    ROS:   C:      "NEGATIVE for fever, chills, change in weight  I:       NEGATIVE for worrisome rashes, moles or lesions  E:     NEGATIVE for vision changes or irritation  E/M: NEGATIVE for ear, mouth and throat problems  R:     NEGATIVE for significant cough or SOB  CV:   NEGATIVE for chest pain, palpitations or peripheral edema  GI:     NEGATIVE for nausea, abdominal pain, heartburn, or change in bowel habits  :   NEGATIVE for frequency, dysuria, hematuria, vaginal discharge, or irregular bleeding  M:     NEGATIVE for significant arthralgias or myalgia  N:      NEGATIVE for weakness, dizziness or paresthesias  E:      NEGATIVE for temperature intolerance, skin/hair changes  P:      NEGATIVE for changes in mood or affect.    EXAM:  /75 (BP Location: Left arm, Patient Position: Sitting, Cuff Size: Adult Regular)   Pulse 92   Temp 98.1  F (36.7  C)   Resp 16   Ht 1.643 m (5' 4.69\")   Wt 62.5 kg (137 lb 11.2 oz)   LMP 04/28/2025 (Exact Date)   BMI 23.14 kg/m     BMI: Body mass index is 23.14 kg/m .      Constitutional: healthy, alert and no distress  Head: Normocephalic. No masses, lesions, tenderness or abnormalities  Neck: Neck supple. Trachea midline. No adenopathy. Thyroid symmetric, normal size.   Cardiovascular: RRR.   Respiratory: Negative.   Breast: Deferred  Gastrointestinal: Abdomen soft, non-tender, non-distended. No masses, organomegaly.  :  Vulva:  No external lesions, normal female hair distribution, no inguinal adenopathy.    Urethra:  Midline, non-tender, well supported, no discharge  Vagina:  Moist, pink, no abnormal discharge, no lesions  Uterus:  Normal size , non-tender, freely mobile  Ovaries:  No masses appreciated, non-tender, mobile  Rectal Exam: deferred  Musculoskeletal: extremities normal  Skin: no suspicious lesions or rashes  Psychiatric: Affect appropriate, cooperative,mentation appears normal.     COUNSELING:   Reviewed preventive health counseling, as reflected in patient instructions    "    Family planning       Folic Acid   reports that she has never smoked. She has never been exposed to tobacco smoke. She has never used smokeless tobacco.    Body mass index is 23.14 kg/m .    FRAX Risk Assessment    ASSESSMENT:  25 year old female with satisfactory annual exam     (Z00.00) Annual wellness visit  (primary encounter diagnosis)  -Normal physical exam  -Routine cancer screenings, due for pap smear today   -Negative PHQ-9 today   Plan: Comprehensive metabolic panel (BMP + Alb, Alk         Phos, ALT, AST, Total. Bili, TP), CBC with         platelets            (Z11.3) Screen for STD (sexually transmitted disease)  Plan: Chlamydia trachomatis/Neisseria gonorrhoeae by         PCR        (Z12.4) Cervical cancer screening  Plan: Pap screen only - recommended age 21 - 24         years, HPV Hold (Lab Only)    (Z31.69) Pre-conception counseling  -We discussed the recommendation to optimize her glycemic control prior to pregnancy. Given hx of Type I DM and congenital cardiac anomaly, I recommend that the patient be seen by MFM for pre-conception counseling   -She does not recall a lot of information about her CDH, she thinks it is a VSD that she was diagnosed with and had a procedure to look at the VSD but it was not repaired. Does not follow with cardiology currently   Plan: Hemoglobin A1c, Rubella Antibody IgG, Varicella        Zoster Virus Antibody IgG, Mat Fetal Med CTR         Referral - Preconception          (M62.89) Pelvic floor dysfunction  -Patient had increased pelvic floor tone on exam and also reports dyspareunia. Referral placed for pelvic floor PT.   Plan: Physical Therapy  Referral         (N92.6) Irregular periods  -Patient reports getting a period once per month but feels like sometimes it is a week late. She does track her cycles but does have that information with her today. She will review this information when she gets home and send us a message with her cycle lengths for the past  6-12 months and can also monitor ovulation at home, if oligo ovulatory she may benefit from ovulation induction.   -No sx of POI or PCOS but will check labs today along with TSH   Plan: TSH with free T4 reflex, Testosterone Free and         Total, Follicle stimulating hormone, Estradiol,        Anti-Mullerian hormone    (Z23) Need for Tdap vaccination  Plan: TDAP 7+ (ADACEL,BOOSTRIX)      Dispo: Pending results of above evaluation     Rhiannon Pino MD

## 2025-04-30 LAB
C TRACH DNA SPEC QL PROBE+SIG AMP: NEGATIVE
N GONORRHOEA DNA SPEC QL NAA+PROBE: NEGATIVE
RUBV IGG SERPL QL IA: 1.95 INDEX
RUBV IGG SERPL QL IA: POSITIVE
SPECIMEN TYPE: NORMAL
VZV IGG SER QL IA: 8.74 S/CO
VZV IGG SER QL IA: POSITIVE

## 2025-05-01 LAB
TESTOST FREE SERPL-MCNC: 0.2 NG/DL
TESTOST SERPL-MCNC: 24 NG/DL (ref 8–60)

## 2025-05-09 ENCOUNTER — TELEPHONE (OUTPATIENT)
Dept: FAMILY MEDICINE | Facility: CLINIC | Age: 25
End: 2025-05-09

## 2025-05-09 NOTE — TELEPHONE ENCOUNTER
Patient Quality Outreach    Patient is due for the following:   Diabetes -  Eye Exam and Foot Exam  Physical Preventive Adult Physical    Action(s) Taken:   Schedule a Adult Preventative    Type of outreach:    Sent "[employer] message.    Questions for provider review:    None         Zamzam Licea, [facility]  Chart routed to None.

## 2025-05-09 NOTE — LETTER
July 24, 2025    Kimberly B Rodrigoagustin Patterson  [address]  [place] [address]    Hello,     Your care team at [facility] values your health and well-being. After reviewing your chart, we have identified recommendation(s) to help you better manage your health.    It's time for your Annual Wellness visit. During your visit, we'll discuss your health, well-being, and any questions you may have related to preventive care. We'll also review any recommended tests, exams, or screenings you might need. To schedule please call see phone number above (only if physical letter) or use your [employer] account.     If you recently had or are having any of these services soon, please contact the clinic via phone or [employer] so that your care team can update your records.    We look forward to seeing you at your upcoming visit.    If you have any questions or concerns, please contact our clinic. Thank you for continuing to trust us with your care.    Sincerely,    Your [facility] Care Team

## 2025-06-13 NOTE — TELEPHONE ENCOUNTER
Patient Quality Outreach    Patient is due for the following:   Diabetes -  A1C, Eye Exam, Microalbumin, and Foot Exam  Physical Preventive Adult Physical    Action(s) Taken:   Schedule a office visit for DM check after 7/29/25. She already had her Px at [place] in April 2025.     Type of outreach:    Sent [employer] message.    Questions for provider review:    None         Zamzam Licea, [facility]  Chart routed to None.

## 2025-07-03 DIAGNOSIS — E10.65 TYPE 1 DIABETES MELLITUS WITH HYPERGLYCEMIA (H): ICD-10-CM

## 2025-07-08 RX ORDER — HYDROCHLOROTHIAZIDE 12.5 MG/1
CAPSULE ORAL
Qty: 6 EACH | Refills: 2 | Status: SHIPPED | OUTPATIENT
Start: 2025-07-08

## 2025-07-08 NOTE — TELEPHONE ENCOUNTER
7/8 Called and left voicemail, provided phone number [phone] to schedule a follow up appointment with Nanette Sykes.     [facility]   Orthopedics, Podiatry, Sports Medicine, Ent ,Eye , Audiology, Adult Endocrine & Diabetes, Nutrition & Medication Therapy Management Specialties   [facility]

## 2025-07-08 NOTE — TELEPHONE ENCOUNTER
Last Written Prescription:  Continuous Glucose Sensor (FREESTYLE TRISHA 3 PLUS SENSOR) MISC 6 each 2 9/9/2024 -- No   Sig: Change every 15 days.     ----------------------  Last Visit Date: 10/25/2024  [facility] Patterson      Future Visit Date: 0  ----------------------      Refill decision:   [x] Medication refilled per  Medication Refill in Ambulatory Care  policy.      *If no FOV, Routing to CC to contact patient for appointment?   [x]  Yes  []  No  []  N/A        Request from pharmacy:  Requested Prescriptions   Pending Prescriptions Disp Refills    Continuous Glucose Sensor (FREESTYLE TRISHA 3 PLUS SENSOR) MISC [Pharmacy Med Name: FREESTYLE TRISHA 3 PLUS SENSR-15 DAY]       Sig: CHANGE EVERY 15 DAYS       Diabetic Supplies Protocol Passed - 7/8/2025  1:04 PM        Passed - Medication is active on med list and the sig matches. RN to manually verify dose and sig if red X/fail.     If the protocol passes (green check), you do not need to verify med dose and sig.    A prescription matches if they are the same clinical intention.    For Example: once daily and every morning are the same.    The protocol can not identify upper and lower case letters as matching and will fail.     For Example: Take 1 tablet (50 mg) by mouth daily     TAKE 1 TABLET (50 MG) BY MOUTH DAILY    For all fails (red x), verify dose and sig.    If the refill does match what is on file, the RN can still proceed to approve the refill request.       If they do not match, route to the appropriate provider.             Passed - Recent (12 month) or future (90 days) visit with authorizing provider's specialty (provided they have been seen in the past 15 months)     The patient must have completed an in-person or virtual visit within the past 12 months or has a future visit scheduled within the next 90 days with the authorizing provider s specialty.  Urgent care and e-visits do not qualify as an office visit for this protocol.           Passed - Medication indicated for associated diagnosis        Passed - Patient is 18 years of age or older

## 2025-07-14 ENCOUNTER — DOCUMENTATION ONLY (OUTPATIENT)
Dept: ENDOCRINOLOGY | Facility: CLINIC | Age: 25
End: 2025-07-14
Payer: COMMERCIAL

## 2025-07-14 NOTE — PROGRESS NOTES
Type of form:  CMN  From:  [employer]  Supplies requested on form:  [employer]  Provider:  Nanette Sykes  Date provider will be in clinic to sign:  7/17/25  Form completed to the best of writers ability and placed in providers file for completion.    Claire Zuniga CMA  Adult Endocrinology  [facility] [place]

## 2025-07-17 ENCOUNTER — MEDICAL CORRESPONDENCE (OUTPATIENT)
Dept: HEALTH INFORMATION MANAGEMENT | Facility: CLINIC | Age: 25
End: 2025-07-17

## 2025-07-17 NOTE — PROGRESS NOTES
Provider reviewed and signed  Faxed back to: [phone]  See image below for Timestamp confirmation of successful transmission.           Placed in [employer] CMN file.      Brie Adair CMA  Adult Endocrinology   [facility]

## 2025-07-24 NOTE — TELEPHONE ENCOUNTER
Patient Quality Outreach    Patient is due for the following:   Diabetes -  A1C, Eye Exam, Microalbumin, and Foot Exam    Action(s) Taken:   Schedule a office visit for DM check    Type of outreach:    Sent letter.    Questions for provider review:    None         Zamzam Licea [facility]  Chart routed to None.

## 2025-08-02 ENCOUNTER — HEALTH MAINTENANCE LETTER (OUTPATIENT)
Age: 25
End: 2025-08-02

## (undated) DEVICE — TAPE TRANSPORE 2"X1.5YD 1534S-2

## (undated) DEVICE — GLOVE SURG PI ULTRA TOUCH M SZ 6-1/2 LF

## (undated) DEVICE — ADH LIQUID MASTISOL TOPICAL VIAL 2-3ML 0523-48

## (undated) DEVICE — CUSTOM PACK GEN MAJOR SBA5BGMHEA

## (undated) DEVICE — DRAPE OR LAPAROTOMY KC 89228*

## (undated) DEVICE — SOL NACL 0.9% IRRIG 1000ML BOTTLE 2F7124

## (undated) DEVICE — SUCTION MANIFOLD NEPTUNE 2 SYS 1 PORT 702-025-000

## (undated) DEVICE — PREP SCRUB SOL EXIDINE 4% CHG 4OZ 29002-404

## (undated) DEVICE — VESSEL LOOP BLUE MAXI 30-723

## (undated) DEVICE — SUTURE SILK 0 TIES 30IN SA86G

## (undated) DEVICE — SUCTION TIP YANKAUER W/O VENT K86

## (undated) DEVICE — TAPE ADH POROUS 3IN CURITY STD 7046C

## (undated) DEVICE — DRSG KERLIX FLUFFS X5

## (undated) DEVICE — NEEDLE HYPO 22X1-1/2 SAFETY 305900

## (undated) DEVICE — GLOVE UNDER INDICATOR PI SZ 6.5 LF 41665

## (undated) DEVICE — SOL WATER IRRIG 1000ML BOTTLE 2F7114

## (undated) DEVICE — GOWN LG DISP 9515

## (undated) DEVICE — PLATE GROUNDING ADULT W/CORD 9165L

## (undated) DEVICE — TRAY PREP DRY SKIN SCRUB 067

## (undated) DEVICE — BLADE KNIFE SURG 15 371115

## (undated) DEVICE — LUBRICANT SURG 2 OZ STRL FLIP TOP 2OZLUB

## (undated) DEVICE — SYR 10ML FINGER CONTROL W/O NDL 309695

## (undated) DEVICE — ESU PENCIL SMOKE EVAC W/ROCKER SWITCH 0703-047-000

## (undated) RX ORDER — GINSENG 100 MG
CAPSULE ORAL
Status: DISPENSED
Start: 2022-09-27

## (undated) RX ORDER — FENTANYL CITRATE 50 UG/ML
INJECTION, SOLUTION INTRAMUSCULAR; INTRAVENOUS
Status: DISPENSED
Start: 2022-09-27

## (undated) RX ORDER — LIDOCAINE HYDROCHLORIDE AND EPINEPHRINE 10; 10 MG/ML; UG/ML
INJECTION, SOLUTION INFILTRATION; PERINEURAL
Status: DISPENSED
Start: 2022-09-27

## (undated) RX ORDER — BUPIVACAINE HYDROCHLORIDE 2.5 MG/ML
INJECTION, SOLUTION EPIDURAL; INFILTRATION; INTRACAUDAL
Status: DISPENSED
Start: 2022-09-27